# Patient Record
Sex: FEMALE | Race: WHITE | Employment: UNEMPLOYED | URBAN - METROPOLITAN AREA
[De-identification: names, ages, dates, MRNs, and addresses within clinical notes are randomized per-mention and may not be internally consistent; named-entity substitution may affect disease eponyms.]

---

## 2017-11-13 ENCOUNTER — HOSPITAL ENCOUNTER (OUTPATIENT)
Dept: MAMMOGRAPHY | Age: 52
Discharge: HOME | End: 2017-11-13
Attending: OBSTETRICS & GYNECOLOGY | Admitting: OBSTETRICS & GYNECOLOGY

## 2017-11-13 DIAGNOSIS — Z12.31 VISIT FOR SCREENING MAMMOGRAM: ICD-10-CM

## 2018-03-12 ENCOUNTER — OFFICE VISIT (OUTPATIENT)
Dept: ORTHOPEDIC SURGERY | Age: 53
End: 2018-03-12

## 2018-03-12 VITALS
HEIGHT: 66 IN | DIASTOLIC BLOOD PRESSURE: 87 MMHG | BODY MASS INDEX: 36.96 KG/M2 | HEART RATE: 73 BPM | WEIGHT: 230 LBS | SYSTOLIC BLOOD PRESSURE: 156 MMHG

## 2018-03-12 DIAGNOSIS — M25.562 LEFT KNEE PAIN, UNSPECIFIED CHRONICITY: Primary | ICD-10-CM

## 2018-03-12 DIAGNOSIS — M17.12 OSTEOARTHRITIS OF LEFT KNEE, UNSPECIFIED OSTEOARTHRITIS TYPE: ICD-10-CM

## 2018-03-12 PROCEDURE — G8484 FLU IMMUNIZE NO ADMIN: HCPCS | Performed by: ORTHOPAEDIC SURGERY

## 2018-03-12 PROCEDURE — 1036F TOBACCO NON-USER: CPT | Performed by: ORTHOPAEDIC SURGERY

## 2018-03-12 PROCEDURE — G8427 DOCREV CUR MEDS BY ELIG CLIN: HCPCS | Performed by: ORTHOPAEDIC SURGERY

## 2018-03-12 PROCEDURE — 3014F SCREEN MAMMO DOC REV: CPT | Performed by: ORTHOPAEDIC SURGERY

## 2018-03-12 PROCEDURE — 20610 DRAIN/INJ JOINT/BURSA W/O US: CPT | Performed by: ORTHOPAEDIC SURGERY

## 2018-03-12 PROCEDURE — 3017F COLORECTAL CA SCREEN DOC REV: CPT | Performed by: ORTHOPAEDIC SURGERY

## 2018-03-12 PROCEDURE — 99203 OFFICE O/P NEW LOW 30 MIN: CPT | Performed by: ORTHOPAEDIC SURGERY

## 2018-03-12 PROCEDURE — G8417 CALC BMI ABV UP PARAM F/U: HCPCS | Performed by: ORTHOPAEDIC SURGERY

## 2018-03-12 RX ORDER — ACETAMINOPHEN 500 MG
500 TABLET ORAL
COMMUNITY

## 2018-03-12 NOTE — PROGRESS NOTES
3/12/18 3:40 PM         NDC: 9812-9388-73    Lot Number: [id number]    Comments: left knee        3/12/18 3:40 PM         NDC: 40300-034-58    Lot Number: [id number]    Comments: left knee

## 2018-03-12 NOTE — PROGRESS NOTES
Date:  3/12/2018    Name:  Adriana Gonzalez  Address:  [address] [place] [address]    :  1965      Age:   46 y.o.    SSN:  [id number]       Medical Record Number:  [id number]    Reason for Visit:    Chief Complaint    New Patient (left knee pain)    History of Present Illness:  Adriana Gonzalez is a 46 y.o. female who presents today for an evaluation of her left knee. She states that she has had left knee pain for about 3 years that has been worsening since February. She denies any recent injury. She does admit to a history of a left knee open medial meniscectomy in . She did recover well from that surgery and was able to return to physical activities. Since February her pain in her knee has increased with going up and down stairs, prolonged sitting, prolonged standing and with sleeping at night. She denies locking/mechanical type symptoms. She did see her primary care physician at the end of last year who sent her to physical therapy. She states that she did do one visit, however was unsure what she was doing was correct. She does occasionally take ibuprofen as needed. Rest and ice to help to somewhat relieve her pain. She denies numbness and tingling down her lower extremities. Pain Assessment  Location of Pain: Knee  Location Modifiers: Left  Severity of Pain: 4  Quality of Pain: Aching, Throbbing  Frequency of Pain: Constant  Aggravating Factors: Other (Comment), Stairs, Walking, Standing, Straightening (at night it is worst)  Limiting Behavior: Some  Relieving Factors: Rest  Result of Injury: No  Work-Related Injury: No  Are there other pain locations you wish to document?: No    Review of Systems:  A 14 point review of systems available from today, 3/12/2018 and is in the scanned medical record as documented by the patient. The review is negative with the exception of those things mentioned in the History of Present Illness and Past Medical History.       Past History:  No past medical

## 2018-03-21 ENCOUNTER — HOSPITAL ENCOUNTER (OUTPATIENT)
Dept: PHYSICAL THERAPY | Age: 53
Discharge: HOME | End: 2018-03-31
Admitting: ORTHOPAEDIC SURGERY

## 2018-03-27 ENCOUNTER — HOSPITAL ENCOUNTER (OUTPATIENT)
Dept: PHYSICAL THERAPY | Age: 53
Discharge: HOME | End: 2018-03-28
Admitting: ORTHOPAEDIC SURGERY

## 2018-03-27 NOTE — FLOWSHEET NOTE
Mild tightness    ITB -Yuriy -Yuriy    Quad +Ely +Ely            ROM PROM AROM Comment    L R L R    Flexion   120 125    Extension   -2  0 with QS 0        Strength L R Comment   Quad 4/5 4+/5    Hamstring 4-/5 4-/5    Gastroc 5/5 5/5    Hip  flexion 4-/5 4/5    Hip abd 3+/5 4-/5        Special Test Results/Comment   Meniscal Click Neg   Crepitus + at 60 deg knee flex   Valgus Laxity Neg   Varus Laxity Neg   Lachmans Neg       Girth L R   Mid Patella 42.0 cm 43.3 cm   Suprapatellar 46.5 cm 47.1 cm   5cm above 49.0 cm 50.2 cm     Reflexes/Sensation:    [x]Dermatomes/Myotomes intact    []Reflexes equal and normal bilaterally   [x]Other: Numbness along L3 and L4 dermatome on L    Joint mobility: hypomobile patella in all directions, especially superior (slight pain with lateral glide)   []Normal    [x]Hypo   []Hyper    Palpation: tenderness along medial joint line and tibial tuberostiy    Functional Mobility/Transfers: independent    Gait: WNL    Balance:  SLS R EO: 30\", L EO: 25\"  SLS EC: 5\" bilaterally       RESTRICTIONS/PRECAUTIONS: none    Exercises/Interventions:     Exercise/Equipment Resistance/Repetitions Other comments   Stretching     Hamstring 3x30\" B Supine with strap   Hip Flexion 3x30\" B Prone, split stance   ITB     Grion     Quad     Inclined Calf     Towel Pull          SLR     Supine 3x10    Prone     Abduction 3x10    Adducton     SLR+          Isometrics     Quad sets          Patellar Glides     Medial     Superior     Inferior          ROM     Passive     Active     Weight Shift     Hang Weights     Sheet Pulls     Ankle Pumps          CKC     Calf raises     Wall sits     Step ups     1 leg stand     Squatting     CC TKE 3x10 Black band   Balance 3x30\" B airex   Bridges 3x10 Red band above knees   Clamshells 3x10 B Red band above knees             PRE     Extension  RANGE:   Flexion  RANGE:        Cable Column          Leg Press  RANGE:        Bike     Treadmill            Plan for next session:

## 2018-04-01 ENCOUNTER — HOSPITAL ENCOUNTER (OUTPATIENT)
Dept: PHYSICAL THERAPY | Age: 53
Discharge: HOME | End: 2018-04-30
Attending: ORTHOPAEDIC SURGERY | Admitting: ORTHOPAEDIC SURGERY

## 2018-04-02 ENCOUNTER — HOSPITAL ENCOUNTER (OUTPATIENT)
Dept: PHYSICAL THERAPY | Age: 53
Discharge: HOME | End: 2018-04-03
Admitting: ORTHOPAEDIC SURGERY

## 2018-04-05 ENCOUNTER — HOSPITAL ENCOUNTER (OUTPATIENT)
Dept: PHYSICAL THERAPY | Age: 53
Discharge: HOME | End: 2018-04-06
Admitting: ORTHOPAEDIC SURGERY

## 2018-04-09 ENCOUNTER — HOSPITAL ENCOUNTER (OUTPATIENT)
Dept: PHYSICAL THERAPY | Age: 53
Discharge: HOME | End: 2018-04-10
Admitting: ORTHOPAEDIC SURGERY

## 2018-04-13 ENCOUNTER — HOSPITAL ENCOUNTER (OUTPATIENT)
Dept: PHYSICAL THERAPY | Age: 53
Discharge: HOME | End: 2018-04-14
Admitting: ORTHOPAEDIC SURGERY

## 2018-04-19 ENCOUNTER — HOSPITAL ENCOUNTER (OUTPATIENT)
Dept: PHYSICAL THERAPY | Age: 53
Discharge: HOME | End: 2018-04-20
Admitting: ORTHOPAEDIC SURGERY

## 2018-04-23 ENCOUNTER — HOSPITAL ENCOUNTER (OUTPATIENT)
Dept: PHYSICAL THERAPY | Age: 53
Discharge: HOME | End: 2018-04-24
Admitting: ORTHOPAEDIC SURGERY

## 2018-04-25 ENCOUNTER — HOSPITAL ENCOUNTER (OUTPATIENT)
Dept: PHYSICAL THERAPY | Age: 53
Discharge: HOME | End: 2018-04-26
Admitting: ORTHOPAEDIC SURGERY

## 2018-04-30 ENCOUNTER — HOSPITAL ENCOUNTER (OUTPATIENT)
Dept: PHYSICAL THERAPY | Age: 53
Discharge: HOME | End: 2018-05-01
Admitting: ORTHOPAEDIC SURGERY

## 2018-05-01 ENCOUNTER — HOSPITAL ENCOUNTER (OUTPATIENT)
Dept: PHYSICAL THERAPY | Age: 53
Discharge: HOME | End: 2018-05-31
Attending: ORTHOPAEDIC SURGERY | Admitting: ORTHOPAEDIC SURGERY

## 2018-05-11 ENCOUNTER — HOSPITAL ENCOUNTER (OUTPATIENT)
Dept: PHYSICAL THERAPY | Age: 53
Discharge: HOME | End: 2018-05-12
Admitting: ORTHOPAEDIC SURGERY

## 2018-05-15 ENCOUNTER — HOSPITAL ENCOUNTER (OUTPATIENT)
Dept: PHYSICAL THERAPY | Age: 53
Discharge: HOME | End: 2018-05-16
Admitting: ORTHOPAEDIC SURGERY

## 2018-06-01 ENCOUNTER — HOSPITAL ENCOUNTER (OUTPATIENT)
Dept: PHYSICAL THERAPY | Age: 53
Discharge: HOME | End: 2018-06-30
Attending: ORTHOPAEDIC SURGERY | Admitting: ORTHOPAEDIC SURGERY

## 2018-06-04 ENCOUNTER — OFFICE VISIT (OUTPATIENT)
Dept: ORTHOPEDIC SURGERY | Age: 53
End: 2018-06-04

## 2018-06-04 VITALS
SYSTOLIC BLOOD PRESSURE: 139 MMHG | BODY MASS INDEX: 36.96 KG/M2 | DIASTOLIC BLOOD PRESSURE: 89 MMHG | HEART RATE: 78 BPM | HEIGHT: 66 IN | WEIGHT: 230 LBS

## 2018-06-04 DIAGNOSIS — M54.2 CERVICAL PAIN (NECK): ICD-10-CM

## 2018-06-04 DIAGNOSIS — M43.6 STIFFNESS OF CERVICAL SPINE: ICD-10-CM

## 2018-06-04 DIAGNOSIS — M17.12 PRIMARY OSTEOARTHRITIS OF LEFT KNEE: Primary | ICD-10-CM

## 2018-06-04 PROCEDURE — 3017F COLORECTAL CA SCREEN DOC REV: CPT | Performed by: ORTHOPAEDIC SURGERY

## 2018-06-04 PROCEDURE — G8427 DOCREV CUR MEDS BY ELIG CLIN: HCPCS | Performed by: ORTHOPAEDIC SURGERY

## 2018-06-04 PROCEDURE — 1036F TOBACCO NON-USER: CPT | Performed by: ORTHOPAEDIC SURGERY

## 2018-06-04 PROCEDURE — 99214 OFFICE O/P EST MOD 30 MIN: CPT | Performed by: ORTHOPAEDIC SURGERY

## 2018-06-04 PROCEDURE — G8417 CALC BMI ABV UP PARAM F/U: HCPCS | Performed by: ORTHOPAEDIC SURGERY

## 2018-06-04 PROCEDURE — 20610 DRAIN/INJ JOINT/BURSA W/O US: CPT | Performed by: ORTHOPAEDIC SURGERY

## 2018-10-03 ENCOUNTER — HOSPITAL ENCOUNTER (OUTPATIENT)
Dept: PHYSICAL THERAPY | Age: 53
Setting detail: THERAPIES SERIES
Discharge: HOME | End: 2018-10-03
Payer: COMMERCIAL

## 2018-10-03 DIAGNOSIS — M17.12 OSTEOARTHRITIS OF LEFT KNEE, UNSPECIFIED OSTEOARTHRITIS TYPE: Primary | ICD-10-CM

## 2018-10-03 PROCEDURE — G8979 MOBILITY GOAL STATUS: HCPCS | Performed by: PHYSICAL THERAPIST

## 2018-10-03 PROCEDURE — 97161 PT EVAL LOW COMPLEX 20 MIN: CPT | Performed by: PHYSICAL THERAPIST

## 2018-10-03 PROCEDURE — G8978 MOBILITY CURRENT STATUS: HCPCS | Performed by: PHYSICAL THERAPIST

## 2018-10-03 PROCEDURE — 97110 THERAPEUTIC EXERCISES: CPT | Performed by: PHYSICAL THERAPIST

## 2018-10-03 NOTE — PLAN OF CARE
joint protection, activity modification, progression of HEP. HEP instruction: Can be found scanned in media file. (see scanned forms)    GOALS:  Patient stated goal: decrease pain    Therapist goals for Patient:   Short Term Goals: To be achieved in: 2 weeks  1. Independent in HEP and progression per patient tolerance, in order to prevent re-injury. 2. Patient will have a decrease in pain to facilitate improvement in movement, function, and ADLs as indicated by Functional Deficits. Long Term Goals: To be achieved in: 4 weeks  1. Disability index score of 20% or less for the LEFS to assist with reaching prior level of function. 2. Patient will demonstrate increased AROM to 0 left knee extension to allow for proper joint functioning as indicated by patients Functional Deficits. 3. Patient will demonstrate an increase in Strength to 5/5 knee ext/hip abd to allow for proper functional mobility as indicated by patients Functional Deficits. 4. Patient will return to walking up/down one flight of stairs without increased symptoms or restriction.         Electronically signed by:  Yas Abreu PT

## 2018-10-03 NOTE — FLOWSHEET NOTE
Patient tolerated treatment well [] Patient limited by fatique  [] Patient limited by pain  [] Patient limited by other medical complications  [] Other:     Prognosis: [x] Good [] Fair  [] Poor    Patient Requires Follow-up: [x] Yes  [] No    PLAN FOR NEXT SESSION:     PLAN: See eval  [] Continue per plan of care [] Alter current plan (see comments)  [x] Plan of care initiated [] Hold pending MD visit [] Discharge    *If patient does not return for further follow ups after this date. Please consider this as the patients discharge from physical therapy.        Electronically signed by: Radha Greenberg PT

## 2018-10-05 ENCOUNTER — HOSPITAL ENCOUNTER (OUTPATIENT)
Dept: PHYSICAL THERAPY | Age: 53
Setting detail: THERAPIES SERIES
Discharge: HOME | End: 2018-10-05
Payer: COMMERCIAL

## 2018-10-05 PROCEDURE — 97110 THERAPEUTIC EXERCISES: CPT | Performed by: SPECIALIST/TECHNOLOGIST

## 2018-10-05 PROCEDURE — G0283 ELEC STIM OTHER THAN WOUND: HCPCS | Performed by: SPECIALIST/TECHNOLOGIST

## 2018-10-05 NOTE — FLOWSHEET NOTE
[facility] [address] [place] [address] [facility]    [facility] Therapy Daily Treatment Note  Date:  10/5/2018    Patient Name:  Raffaele Nixon    :  1965  MRN: [id number]  Restrictions/Precautions:    Medical/Treatment Diagnosis Information:  · Diagnosis: Left knee OA  M17.12  · Treatment Diagnosis: PT practice pattern: 4D,  left knee pain  Insurance/Certification information:  PT Insurance Information: [facility]  50 visits/yr (11 previously used)  $0 copay  Physician Information:  Referring Practitioner: Dr Jefferson Tapia of care signed (Y/N):     Date of Patient follow up with Physician:     G-Code (if applicable):  CK    Date G-Code Applied:  10/3/18  PT G-Codes  Functional Assessment Tool Used: LEFS  Score: 56%  Functional Limitation: Mobility: Walking and moving around  Mobility: Walking and Moving Around Current Status (): At least 40 percent but less than 60 percent impaired, limited or restricted  Mobility: Walking and Moving Around Goal Status (): At least 1 percent but less than 20 percent impaired, limited or restricted    Progress Note: [x]  Yes  []  No  Next due by: Visit #10       Latex Allergy:  [x]NO      []YES  Preferred Language for Healthcare:   [x]English       []other:    Visit # Insurance Allowable   2 50 (11 previously used)     Auth Required   []  Yes    [x] No    Visits Approved  Date Ranged-       Pain level:  4/10     SUBJECTIVE:  Pt. Reports her knees feel achy. OBJECTIVE:   Observation:   Test measurements:      RESTRICTIONS/PRECAUTIONS: OA, HBP, L knee scope '81    Exercises/Interventions:       Script: 10/31/18 MD 10/12/18  Exercise Sets/Reps Notes Last Progression   Gastroc Stretch 3x30'' Slant     Heelslides       LLLD Wallslide      HS Stretch:  Tableside  3x30''     SAQ      LAQ      SLR Flex 3x10     SLR Abd 3x10      SLR Ext      SLR Add.       Clamshells - SL X 20 B New     Bridges 3x10     HR/TR      Ankle Theraband      BAPS      Bike implemented, too soon to assess goals progression  [] Other:     ASSESSMENT:  See eval    Treatment/Activity Tolerance:  [x] Patient tolerated treatment well [] Patient limited by fatique  [] Patient limited by pain  [] Patient limited by other medical complications  [] Other:     Prognosis: [x] Good [] Fair  [] Poor    Patient Requires Follow-up: [x] Yes  [] No    PLAN FOR NEXT SESSION:     PLAN: See eval  [] Continue per plan of care [] Alter current plan (see comments)  [x] Plan of care initiated [] Hold pending MD visit [] Discharge    *If patient does not return for further follow ups after this date. Please consider this as the patients discharge from physical therapy.        Electronically signed by: Carolyne Sahu, [address], Griselda Costain, [address]

## 2018-10-11 ENCOUNTER — HOSPITAL ENCOUNTER (OUTPATIENT)
Dept: PHYSICAL THERAPY | Age: 53
Setting detail: THERAPIES SERIES
Discharge: HOME | End: 2018-10-11
Payer: COMMERCIAL

## 2018-10-11 PROCEDURE — 97112 NEUROMUSCULAR REEDUCATION: CPT | Performed by: PHYSICAL THERAPIST

## 2018-10-11 PROCEDURE — 97110 THERAPEUTIC EXERCISES: CPT | Performed by: PHYSICAL THERAPIST

## 2018-10-11 NOTE — FLOWSHEET NOTE
listed. [] Progression has been slowed due to co-morbidities. [x] Plan just implemented, too soon to assess goals progression  [] Other:     ASSESSMENT:  See eval    Treatment/Activity Tolerance:  [x] Patient tolerated treatment well [] Patient limited by fatique  [] Patient limited by pain  [] Patient limited by other medical complications  [] Other:     Prognosis: [x] Good [] Fair  [] Poor    Patient Requires Follow-up: [x] Yes  [] No    PLAN FOR NEXT SESSION:     PLAN: See eval  [] Continue per plan of care [] Alter current plan (see comments)  [x] Plan of care initiated [] Hold pending MD visit [] Discharge    *If patient does not return for further follow ups after this date. Please consider this as the patients discharge from physical therapy.        Electronically signed by: Jenifer Claudio PT [id number]

## 2018-10-12 ENCOUNTER — HOSPITAL ENCOUNTER (OUTPATIENT)
Dept: PHYSICAL THERAPY | Age: 53
Setting detail: THERAPIES SERIES
Discharge: HOME | End: 2018-10-12
Payer: COMMERCIAL

## 2018-10-12 PROCEDURE — G0283 ELEC STIM OTHER THAN WOUND: HCPCS | Performed by: SPECIALIST/TECHNOLOGIST

## 2018-10-12 PROCEDURE — 97110 THERAPEUTIC EXERCISES: CPT | Performed by: SPECIALIST/TECHNOLOGIST

## 2018-10-12 PROCEDURE — 97112 NEUROMUSCULAR REEDUCATION: CPT | Performed by: SPECIALIST/TECHNOLOGIST

## 2018-10-12 NOTE — FLOWSHEET NOTE
[address] [facility]    [facility] Daily Treatment Note  Date:  10/12/2018    Patient Name:  Betty Dietrich    :  1965  MRN: [id number]  Restrictions/Precautions:    Medical/Treatment Diagnosis Information:  · Diagnosis: Left knee OA  M17.12  · Treatment Diagnosis: PT practice pattern: 4D,  left knee pain  Insurance/Certification information:  PT Insurance Information: [facility]  50 visits/yr (11 previously used)  $0 copay  Physician Information:  Referring Practitioner: Dr Nerissa Giang of care signed (Y/N):     Date of Patient follow up with Physician:     G-Code (if applicable):  CK    Date G-Code Applied:  10/3/18  PT G-Codes  Functional Assessment Tool Used: LEFS  Score: 56%  Functional Limitation: Mobility: Walking and moving around  Mobility: Walking and Moving Around Current Status (): At least 40 percent but less than 60 percent impaired, limited or restricted  Mobility: Walking and Moving Around Goal Status (): At least 1 percent but less than 20 percent impaired, limited or restricted    Progress Note: [x]  Yes  []  No  Next due by: Visit #10       Latex Allergy:  [x]NO      []YES  Preferred Language for Healthcare:   [x]English       []other:    Visit # Insurance Allowable   4 48 (11 previously used)     Auth Required   []  Yes    [x] No    Visits Approved  Date Ranged-       Pain level:  4/10     SUBJECTIVE:  States the knee is doing fine today, still achy at times. OBJECTIVE:   Observation:   Test measurements:      RESTRICTIONS/PRECAUTIONS: OA, HBP, L knee scope '81    Exercises/Interventions:       Script: 10/31/18 MD 10/12/18  Exercise Sets/Reps Notes Last Progression   Gastroc Stretch 3x30'' Slant     Heelslides       LLLD Wallslide      HS Stretch:  Tableside  3x30''  manual   SAQ      LAQ      SLR Flex 3x10 staggered    SLR Abd 3x10      SLR Ext      SLR Add.       Clamshells - SL [place] loop X 30 B     Bridges - ball squeeze  3x10     HR/TR      Ankle Theraband      BAPS      Bike 5'     Elliptical      Standing Stretch:  (insert muscles)      Weight Shifting      Lateral Walk Orange loop 2x     Squats      Single Leg Stance Balance      Leg press 80# x 20 Hold today D/T pt was here yesterday    PEP 2'     [address] [place] 60# 20 x 3\" Hold today D/T pt was here yesterday. [employer] 6' x 20 each New             Therapeutic Exercise and NMR EXR  [x] (50687) Provided verbal/tactile cueing for activities related to strengthening, flexibility, endurance, ROM for improvements in LE, proximal hip, and core control with self care, mobility, lifting, ambulation. [x] (33582) Provided verbal/tactile cueing for activities related to improving balance, coordination, kinesthetic sense, posture, motor skill, proprioception  to assist with LE, proximal hip, and core control in self care, mobility, lifting, ambulation and eccentric single leg control.      NMR and Therapeutic Activities:    [] (62356 or 14295) Provided verbal/tactile cueing for activities related to improving balance, coordination, kinesthetic sense, posture, motor skill, proprioception and motor activation to allow for proper function of core, proximal hip and LE with self care and ADLs  [] (83483) Gait Re-education- Provided training and instruction to the patient for proper LE, core and proximal hip recruitment and positioning and eccentric body weight control with ambulation re-education including up and down stairs     Home Exercise Program:    [x] (01976) Reviewed/Progressed HEP activities related to strengthening, flexibility, endurance, ROM of core, proximal hip and LE for functional self-care, mobility, lifting and ambulation/stair navigation   [] (04599)Reviewed/Progressed HEP activities related to improving balance, coordination, kinesthetic sense, posture, motor skill, proprioception of core, proximal hip and LE for self care, mobility, lifting, and ambulation/stair progressing as expected towards functional goals listed. [] Progression is slowed due to complexities listed. [] Progression has been slowed due to co-morbidities. [x] Plan just implemented, too soon to assess goals progression  [] Other:     ASSESSMENT:  See eval    Treatment/Activity Tolerance:  [x] Patient tolerated treatment well [] Patient limited by fatique  [] Patient limited by pain  [] Patient limited by other medical complications  [] Other:     Prognosis: [x] Good [] Fair  [] Poor    Patient Requires Follow-up: [x] Yes  [] No    PLAN FOR NEXT SESSION:     PLAN: See eval  [] Continue per plan of care [] Alter current plan (see comments)  [x] Plan of care initiated [] Hold pending MD visit [] Discharge    *If patient does not return for further follow ups after this date. Please consider this as the patients discharge from physical therapy.        Electronically signed by: Esperanza Luna, [address], Karen Hong [address]

## 2018-10-15 ENCOUNTER — OFFICE VISIT (OUTPATIENT)
Dept: ORTHOPEDIC SURGERY | Age: 53
End: 2018-10-15
Payer: COMMERCIAL

## 2018-10-15 ENCOUNTER — HOSPITAL ENCOUNTER (OUTPATIENT)
Dept: PHYSICAL THERAPY | Age: 53
Setting detail: THERAPIES SERIES
Discharge: HOME | End: 2018-10-15
Payer: COMMERCIAL

## 2018-10-15 VITALS
SYSTOLIC BLOOD PRESSURE: 132 MMHG | WEIGHT: 200 LBS | DIASTOLIC BLOOD PRESSURE: 76 MMHG | BODY MASS INDEX: 32.14 KG/M2 | HEART RATE: 84 BPM | HEIGHT: 66 IN

## 2018-10-15 DIAGNOSIS — Z01.89 ENCOUNTER FOR LOWER EXTREMITY COMPARISON IMAGING STUDY: ICD-10-CM

## 2018-10-15 DIAGNOSIS — M22.2X1 RIGHT PATELLOFEMORAL SYNDROME: ICD-10-CM

## 2018-10-15 DIAGNOSIS — M25.561 RIGHT KNEE PAIN, UNSPECIFIED CHRONICITY: Primary | ICD-10-CM

## 2018-10-15 PROCEDURE — G8484 FLU IMMUNIZE NO ADMIN: HCPCS | Performed by: ORTHOPAEDIC SURGERY

## 2018-10-15 PROCEDURE — 99213 OFFICE O/P EST LOW 20 MIN: CPT | Performed by: ORTHOPAEDIC SURGERY

## 2018-10-15 PROCEDURE — G0283 ELEC STIM OTHER THAN WOUND: HCPCS | Performed by: SPECIALIST/TECHNOLOGIST

## 2018-10-15 PROCEDURE — 97112 NEUROMUSCULAR REEDUCATION: CPT | Performed by: SPECIALIST/TECHNOLOGIST

## 2018-10-15 PROCEDURE — G8417 CALC BMI ABV UP PARAM F/U: HCPCS | Performed by: ORTHOPAEDIC SURGERY

## 2018-10-15 PROCEDURE — 3017F COLORECTAL CA SCREEN DOC REV: CPT | Performed by: ORTHOPAEDIC SURGERY

## 2018-10-15 PROCEDURE — 1036F TOBACCO NON-USER: CPT | Performed by: ORTHOPAEDIC SURGERY

## 2018-10-15 PROCEDURE — 97110 THERAPEUTIC EXERCISES: CPT | Performed by: SPECIALIST/TECHNOLOGIST

## 2018-10-15 PROCEDURE — G8427 DOCREV CUR MEDS BY ELIG CLIN: HCPCS | Performed by: ORTHOPAEDIC SURGERY

## 2018-10-15 NOTE — FLOWSHEET NOTE
Progression is slowed due to complexities listed. [] Progression has been slowed due to co-morbidities. [x] Plan just implemented, too soon to assess goals progression  [] Other:     ASSESSMENT:  LLE strength and stability is improving. Treatment/Activity Tolerance:  [x] Patient tolerated treatment well [] Patient limited by fatique  [] Patient limited by pain  [] Patient limited by other medical complications  [] Other:     Prognosis: [x] Good [] Fair  [] Poor    Patient Requires Follow-up: [x] Yes  [] No    PLAN FOR NEXT SESSION:     PLAN: See eval  [] Continue per plan of care [] Alter current plan (see comments)  [x] Plan of care initiated [] Hold pending MD visit [] Discharge    *If patient does not return for further follow ups after this date. Please consider this as the patients discharge from physical therapy.        Electronically signed by: Esme Can, [address], Karen Melgar 1

## 2018-10-15 NOTE — PROGRESS NOTES
I have reviewed and agree to the content of the note created by the Physical Therapist Assistant.     Electronically signed by Gordon De La O PT

## 2018-10-17 NOTE — PROGRESS NOTES
it was checked for errors. It is possible that there are still dictated errors within this office note. If so, please bring any errors to my attention for an addendum. All efforts were made to ensure that this office note is accurate.

## 2018-10-18 ENCOUNTER — HOSPITAL ENCOUNTER (OUTPATIENT)
Dept: PHYSICAL THERAPY | Age: 53
Setting detail: THERAPIES SERIES
Discharge: HOME | End: 2018-10-18
Payer: COMMERCIAL

## 2018-10-18 PROCEDURE — G0283 ELEC STIM OTHER THAN WOUND: HCPCS | Performed by: SPECIALIST/TECHNOLOGIST

## 2018-10-18 PROCEDURE — 97110 THERAPEUTIC EXERCISES: CPT | Performed by: SPECIALIST/TECHNOLOGIST

## 2018-10-18 PROCEDURE — 97112 NEUROMUSCULAR REEDUCATION: CPT | Performed by: SPECIALIST/TECHNOLOGIST

## 2018-10-18 NOTE — FLOWSHEET NOTE
Scar Mobs / STM/Rollerstick / Knee (Flex./Ext.)  Stretch: H.S. / ITB / Piriformis / Quad / Groin / Hip Flexor   [] (09638) Provided manual therapy to mobilize LE, proximal hip and/or LS spine soft tissue/joints for the purpose of modulating pain, promoting relaxation,  increasing ROM, reducing/eliminating soft tissue swelling/inflammation/restriction, improving soft tissue extensibility and allowing for proper ROM for normal function with self care, mobility, lifting and ambulation. Modalities: PM + CP 15'     Charges:  Timed Code Treatment Minutes: 40   Total Treatment Minutes: 55     [] EVAL (LOW) 30631 (typically 20 minutes face-to-face)  [] EVAL (MOD) 15548 (typically 30 minutes face-to-face)  [] EVAL (HIGH) 92270 (typically 45 minutes face-to-face)  [] RE-EVAL     [x] GH(52547) x  2   [] IONTO  [x] NMR (17361) x  1   [] VASO  [] Manual (73561) x       [] Other:  [] TA x       [] Mech Traction (05271)  [] ES(attended) (43733)      [x] ES (un) (75177):     GOALS:   Short Term Goals: To be achieved in: 2 weeks  1. Independent in HEP and progression per patient tolerance, in order to prevent re-injury. 2. Patient will have a decrease in pain to facilitate improvement in movement, function, and ADLs as indicated by Functional Deficits. Long Term Goals: To be achieved in: 4 weeks  1. Disability index score of 20% or less for the LEFS to assist with reaching prior level of function. 2. Patient will demonstrate increased AROM to 0 left knee extension to allow for proper joint functioning as indicated by patients Functional Deficits. 3. Patient will demonstrate an increase in Strength to 5/5 knee ext/hip abd to allow for proper functional mobility as indicated by patients Functional Deficits. 4. Patient will return to walking up/down one flight of stairs without increased symptoms or restriction.      Progression Towards Functional goals:  [] Patient is progressing as expected towards functional goals

## 2018-10-29 ENCOUNTER — HOSPITAL ENCOUNTER (OUTPATIENT)
Dept: PHYSICAL THERAPY | Age: 53
Setting detail: THERAPIES SERIES
Discharge: HOME | End: 2018-10-29
Payer: COMMERCIAL

## 2018-10-29 PROCEDURE — 97110 THERAPEUTIC EXERCISES: CPT | Performed by: SPECIALIST/TECHNOLOGIST

## 2018-10-29 PROCEDURE — 97112 NEUROMUSCULAR REEDUCATION: CPT | Performed by: SPECIALIST/TECHNOLOGIST

## 2018-10-29 PROCEDURE — G0283 ELEC STIM OTHER THAN WOUND: HCPCS | Performed by: SPECIALIST/TECHNOLOGIST

## 2018-11-01 ENCOUNTER — HOSPITAL ENCOUNTER (OUTPATIENT)
Dept: PHYSICAL THERAPY | Age: 53
Setting detail: THERAPIES SERIES
Discharge: HOME | End: 2018-11-01
Payer: COMMERCIAL

## 2018-11-01 PROCEDURE — 97110 THERAPEUTIC EXERCISES: CPT | Performed by: PHYSICAL THERAPIST

## 2018-11-01 PROCEDURE — G0283 ELEC STIM OTHER THAN WOUND: HCPCS | Performed by: PHYSICAL THERAPIST

## 2018-11-01 PROCEDURE — 97112 NEUROMUSCULAR REEDUCATION: CPT | Performed by: PHYSICAL THERAPIST

## 2018-11-01 NOTE — FLOWSHEET NOTE
3x10     HR/TR      Ankle Theraband      BAPS      Bike 5' . Elliptical      Standing Stretch:  (insert muscles)      Weight Shifting      Lateral Walk Orange loop 2x     Squats      Single Leg Stance Balance      Leg press 100# x 30 Increase reps     PEP 2'     [address]  abd 60# 30 x 3\"  45# x 20 B   New     Step up and over 6' x 20  New             Therapeutic Exercise and NMR EXR  [x] (49884) Provided verbal/tactile cueing for activities related to strengthening, flexibility, endurance, ROM for improvements in LE, proximal hip, and core control with self care, mobility, lifting, ambulation. [x] (36836) Provided verbal/tactile cueing for activities related to improving balance, coordination, kinesthetic sense, posture, motor skill, proprioception  to assist with LE, proximal hip, and core control in self care, mobility, lifting, ambulation and eccentric single leg control.      NMR and Therapeutic Activities:    [] (06141 or 51040) Provided verbal/tactile cueing for activities related to improving balance, coordination, kinesthetic sense, posture, motor skill, proprioception and motor activation to allow for proper function of core, proximal hip and LE with self care and ADLs  [] (58458) Gait Re-education- Provided training and instruction to the patient for proper LE, core and proximal hip recruitment and positioning and eccentric body weight control with ambulation re-education including up and down stairs     Home Exercise Program:    [x] (19813) Reviewed/Progressed HEP activities related to strengthening, flexibility, endurance, ROM of core, proximal hip and LE for functional self-care, mobility, lifting and ambulation/stair navigation   [] (38288)Reviewed/Progressed HEP activities related to improving balance, coordination, kinesthetic sense, posture, motor skill, proprioception of core, proximal hip and LE for self care, mobility, lifting, and ambulation/stair navigation      Manual Treatments:  PROM /

## 2018-11-05 ENCOUNTER — HOSPITAL ENCOUNTER (OUTPATIENT)
Dept: PHYSICAL THERAPY | Age: 53
Setting detail: THERAPIES SERIES
Discharge: HOME | End: 2018-11-05
Payer: COMMERCIAL

## 2018-11-08 ENCOUNTER — HOSPITAL ENCOUNTER (OUTPATIENT)
Dept: PHYSICAL THERAPY | Age: 53
Setting detail: THERAPIES SERIES
Discharge: HOME | End: 2018-11-08
Payer: COMMERCIAL

## 2018-11-08 PROCEDURE — G8979 MOBILITY GOAL STATUS: HCPCS | Performed by: PHYSICAL THERAPIST

## 2018-11-08 PROCEDURE — G8978 MOBILITY CURRENT STATUS: HCPCS | Performed by: PHYSICAL THERAPIST

## 2018-11-08 PROCEDURE — 97110 THERAPEUTIC EXERCISES: CPT | Performed by: PHYSICAL THERAPIST

## 2018-11-08 PROCEDURE — G0283 ELEC STIM OTHER THAN WOUND: HCPCS | Performed by: PHYSICAL THERAPIST

## 2018-11-08 PROCEDURE — 97112 NEUROMUSCULAR REEDUCATION: CPT | Performed by: PHYSICAL THERAPIST

## 2018-11-08 NOTE — FLOWSHEET NOTE
Knee (Flex./Ext.)  Stretch: H.S. / ITB / Piriformis / Quad / Groin / Hip Flexor   [] (59120) Provided manual therapy to mobilize LE, proximal hip and/or LS spine soft tissue/joints for the purpose of modulating pain, promoting relaxation,  increasing ROM, reducing/eliminating soft tissue swelling/inflammation/restriction, improving soft tissue extensibility and allowing for proper ROM for normal function with self care, mobility, lifting and ambulation. Modalities: PM + CP 15'     Charges:  Timed Code Treatment Minutes: 40   Total Treatment Minutes: 55     [] EVAL (LOW) 65589 (typically 20 minutes face-to-face)  [] EVAL (MOD) 80363 (typically 30 minutes face-to-face)  [] EVAL (HIGH) 99741 (typically 45 minutes face-to-face)  [] RE-EVAL     [x] JH(13340) x  2   [] IONTO  [x] NMR (24347) x  1   [] VASO  [] Manual (43457) x       [] Other:  [] TA x       [] Mech Traction (52978)  [] ES(attended) (01274)      [x] ES (un) (81078):     GOALS:   Short Term Goals: To be achieved in: 2 weeks  1. Independent in HEP and progression per patient tolerance, in order to prevent re-injury. 2. Patient will have a decrease in pain to facilitate improvement in movement, function, and ADLs as indicated by Functional Deficits. Long Term Goals: To be achieved in: 4 weeks  1. Disability index score of 20% or less for the LEFS to assist with reaching prior level of function. 2. Patient will demonstrate increased AROM to 0 left knee extension to allow for proper joint functioning as indicated by patients Functional Deficits. 3. Patient will demonstrate an increase in Strength to 5/5 knee ext/hip abd to allow for proper functional mobility as indicated by patients Functional Deficits. 4. Patient will return to walking up/down one flight of stairs without increased symptoms or restriction. Progression Towards Functional goals:  [] Patient is progressing as expected towards functional goals listed.     [] Progression is

## 2018-11-08 NOTE — PLAN OF CARE
Strength     Hip abd  4/5   Knee ext  4+/5               Gait: Decreased heel strike     Joint mobility:    []Normal    [x]Hypo   []Hyper    Palpation: medial joint line    Orthopedic Tests: n/a    OTHER:      ASSESSMENT: Pain symptoms are intermittent. Some increase in quad and glut medius strength. Endurance is fair and fatigues easily when doing exercises. Response to Treatment:   [x]Patient is responding well to treatment and improvement is noted with regards  to goals   []Patient should continue to improve in reasonable time if they continue HEP   []Patient has plateaued and is no longer responding to skilled PT intervention    []Patient is getting worse and would benefit from return to referring MD   []Patient unable to adhere to initial POC    Functional deficiencies/Impairements which affect ADL's and Reduce overall function:     [x]decreased core/proximal hip strength and neuromuscular control - Reduced  overall function   [x]decreased LE ROM/joint mobility- Reduced overall Function    [x]decreased LE strength- Reduced overall function with gait and steps   []difficulty with SLS- Reduced overall function and possible falls risk   [x]pain/difficulty with ambulation- reduced overall function and mobility   []unable to perform sport/recreational activity due to pain and dysfunction   []other:       Prognosis/Rehab Potential:    []Excellent   [x]Good    []Fair   []Poor: Toleration of evaluation or treatment:    []Excellent   [x]Good    []Fair   []Poor       GOALS: Long Term Goals: To be achieved in: 4 weeks  1. Disability index score of 20% or less for the LEFS to assist with reaching prior level of function. 2. Patient will demonstrate increased AROM to 0 left knee extension to allow for proper joint functioning as indicated by patients Functional Deficits.    3. Patient will demonstrate an increase in Strength to 5/5 knee ext/hip abd to allow for proper functional mobility as indicated by

## 2018-11-12 ENCOUNTER — HOSPITAL ENCOUNTER (OUTPATIENT)
Dept: PHYSICAL THERAPY | Age: 53
Setting detail: THERAPIES SERIES
Discharge: HOME | End: 2018-11-12
Payer: COMMERCIAL

## 2018-11-12 PROCEDURE — G0283 ELEC STIM OTHER THAN WOUND: HCPCS | Performed by: PHYSICAL THERAPIST

## 2018-11-12 PROCEDURE — 97110 THERAPEUTIC EXERCISES: CPT | Performed by: PHYSICAL THERAPIST

## 2018-11-12 PROCEDURE — 97112 NEUROMUSCULAR REEDUCATION: CPT | Performed by: PHYSICAL THERAPIST

## 2018-11-12 NOTE — FLOWSHEET NOTE
and LE for self care, mobility, lifting, and ambulation/stair navigation      Manual Treatments:  PROM / Scar Mobs / STM/Rollerstick / Knee (Flex./Ext.)  Stretch: H.S. / ITB / Piriformis / [place] / Groin / Hip Flexor   [] (73106) Provided manual therapy to mobilize LE, proximal hip and/or LS spine soft tissue/joints for the purpose of modulating pain, promoting relaxation,  increasing ROM, reducing/eliminating soft tissue swelling/inflammation/restriction, improving soft tissue extensibility and allowing for proper ROM for normal function with self care, mobility, lifting and ambulation. Modalities: PM + CP 15'     Charges:  Timed Code Treatment Minutes: 40   Total Treatment Minutes: 55     [] EVAL (LOW) 91297 (typically 20 minutes face-to-face)  [] EVAL (MOD) 25710 (typically 30 minutes face-to-face)  [] EVAL (HIGH) 30848 (typically 45 minutes face-to-face)  [] RE-EVAL     [x] YD(73946) x  2   [] IONTO  [x] NMR (89319) x  1   [] VASO  [] Manual (60764) x       [] Other:  [] TA x       [] Mech Traction (94038)  [] ES(attended) (02975)      [x] ES (un) (40222):     GOALS:   Short Term Goals: To be achieved in: 2 weeks  1. Independent in HEP and progression per patient tolerance, in order to prevent re-injury. 2. Patient will have a decrease in pain to facilitate improvement in movement, function, and ADLs as indicated by Functional Deficits. Long Term Goals: To be achieved in: 4 weeks  1. Disability index score of 20% or less for the LEFS to assist with reaching prior level of function. 2. Patient will demonstrate increased AROM to 0 left knee extension to allow for proper joint functioning as indicated by patients Functional Deficits. 3. Patient will demonstrate an increase in Strength to 5/5 knee ext/hip abd to allow for proper functional mobility as indicated by patients Functional Deficits. 4. Patient will return to walking up/down one flight of stairs without increased symptoms or restriction.

## 2018-11-15 ENCOUNTER — HOSPITAL ENCOUNTER (OUTPATIENT)
Dept: PHYSICAL THERAPY | Age: 53
Setting detail: THERAPIES SERIES
Discharge: HOME | End: 2018-11-15
Payer: COMMERCIAL

## 2018-11-15 PROCEDURE — G0283 ELEC STIM OTHER THAN WOUND: HCPCS | Performed by: SPECIALIST/TECHNOLOGIST

## 2018-11-15 PROCEDURE — 97112 NEUROMUSCULAR REEDUCATION: CPT | Performed by: SPECIALIST/TECHNOLOGIST

## 2018-11-15 PROCEDURE — 97110 THERAPEUTIC EXERCISES: CPT | Performed by: SPECIALIST/TECHNOLOGIST

## 2018-11-15 NOTE — PROGRESS NOTES
I have reviewed and agree to the content of the note created by the Physical Therapist Assistant.     Electronically signed by Malissa Lovelace PT

## 2018-11-19 ENCOUNTER — HOSPITAL ENCOUNTER (OUTPATIENT)
Dept: PHYSICAL THERAPY | Age: 53
Setting detail: THERAPIES SERIES
Discharge: HOME | End: 2018-11-19
Payer: COMMERCIAL

## 2018-11-21 ENCOUNTER — HOSPITAL ENCOUNTER (OUTPATIENT)
Dept: PHYSICAL THERAPY | Age: 53
Setting detail: THERAPIES SERIES
Discharge: HOME | End: 2018-11-21
Payer: COMMERCIAL

## 2018-11-21 PROCEDURE — 97110 THERAPEUTIC EXERCISES: CPT | Performed by: SPECIALIST/TECHNOLOGIST

## 2018-11-21 PROCEDURE — G0283 ELEC STIM OTHER THAN WOUND: HCPCS | Performed by: SPECIALIST/TECHNOLOGIST

## 2018-11-21 PROCEDURE — 97112 NEUROMUSCULAR REEDUCATION: CPT | Performed by: SPECIALIST/TECHNOLOGIST

## 2018-11-26 ENCOUNTER — HOSPITAL ENCOUNTER (OUTPATIENT)
Dept: PHYSICAL THERAPY | Age: 53
Setting detail: THERAPIES SERIES
Discharge: HOME | End: 2018-11-26
Payer: COMMERCIAL

## 2018-11-26 ENCOUNTER — HOSPITAL ENCOUNTER (OUTPATIENT)
Dept: MAMMOGRAPHY | Age: 53
Discharge: HOME | End: 2018-11-26
Payer: COMMERCIAL

## 2018-11-26 DIAGNOSIS — Z12.31 VISIT FOR SCREENING MAMMOGRAM: ICD-10-CM

## 2018-11-26 PROCEDURE — 97110 THERAPEUTIC EXERCISES: CPT | Performed by: PHYSICAL THERAPIST

## 2018-11-26 PROCEDURE — 97112 NEUROMUSCULAR REEDUCATION: CPT | Performed by: PHYSICAL THERAPIST

## 2018-11-26 PROCEDURE — G0283 ELEC STIM OTHER THAN WOUND: HCPCS | Performed by: PHYSICAL THERAPIST

## 2018-11-26 PROCEDURE — 77067 SCR MAMMO BI INCL CAD: CPT

## 2018-11-26 NOTE — FLOWSHEET NOTE
[facility] [address] [place] [address] [facility] [place]    Physical Therapy Daily Treatment Note  Date:  2018    Patient Name:  Con Regalado    :  1965  MRN: [id number]  Restrictions/Precautions:    Medical/Treatment Diagnosis Information:  · Diagnosis: Left knee OA  M17.12  · Treatment Diagnosis: PT practice pattern: 4D,  left knee pain  Insurance/Certification information:  PT Insurance Information: [facility]  50 visits/yr (11 previously used)  $0 copay  Physician Information:  Referring Practitioner: Dr Eliot Carter of care signed (Y/N):     Date of Patient follow up with Physician:     G-Code (if applicable):  CK    Date G-Code Applied:  10/3/18  PT G-Codes  Functional Assessment Tool Used: LEFS  Score: 56%  Functional Limitation: Mobility: Walking and moving around  Mobility: Walking and Moving Around Current Status (): At least 40 percent but less than 60 percent impaired, limited or restricted  Mobility: Walking and Moving Around Goal Status (): At least 1 percent but less than 20 percent impaired, limited or restricted    Progress Note: [x]  Yes  []  No  Next due by: Visit #10       Latex Allergy:  [x]NO      []YES  Preferred Language for Healthcare:   [x]English       []other:    Visit # Insurance Allowable   13 (next note needed on visit 23) 48 (11 previously used)     Auth Required   []  Yes    [x] No    Visits Approved  Date Ranged-       Pain level:  4-5/10     SUBJECTIVE:  Patient states her hip was hurting yesterday and she went to to hang shannon light which did help with the pain. Was able to go up and down about 15 flights of stairs yesterday.      OBJECTIVE:    Observation:   Test measurements:  See re-cert note    RESTRICTIONS/PRECAUTIONS: OA, HBP, L knee scope '81    Exercises/Interventions:       Script: 18   MD 10/12/18  Exercise Sets/Reps Notes Last Progression   Gastroc Stretch 3x30'' Slant     Heelslides       LLLD Wallslide      HS Stretch: flight of stairs without increased symptoms or restriction. Progression Towards Functional goals:  [] Patient is progressing as expected towards functional goals listed. [] Progression is slowed due to complexities listed. [] Progression has been slowed due to co-morbidities. [x] Plan just implemented, too soon to assess goals progression  [] Other:     ASSESSMENT:  Pt is demonstrating increase stability and strength in LLE. Treatment/Activity Tolerance:  [x] Patient tolerated treatment well [] Patient limited by fatique  [] Patient limited by pain  [] Patient limited by other medical complications  [] Other:     Prognosis: [x] Good [] Fair  [] Poor    Patient Requires Follow-up: [x] Yes  [] No    PLAN FOR NEXT SESSION:     PLAN:   [x] Continue per plan of care [] Alter current plan (see comments)  [x] Plan of care initiated [] Hold pending MD visit [] Discharge    *If patient does not return for further follow ups after this date. Please consider this as the patients discharge from physical therapy.        Electronically signed by:  Karen Adair 1

## 2018-11-29 ENCOUNTER — HOSPITAL ENCOUNTER (OUTPATIENT)
Dept: PHYSICAL THERAPY | Age: 53
Setting detail: THERAPIES SERIES
Discharge: HOME | End: 2018-11-29
Payer: COMMERCIAL

## 2018-11-29 PROCEDURE — 97112 NEUROMUSCULAR REEDUCATION: CPT | Performed by: SPECIALIST/TECHNOLOGIST

## 2018-11-29 PROCEDURE — 97110 THERAPEUTIC EXERCISES: CPT | Performed by: SPECIALIST/TECHNOLOGIST

## 2018-11-29 PROCEDURE — G0283 ELEC STIM OTHER THAN WOUND: HCPCS | Performed by: SPECIALIST/TECHNOLOGIST

## 2018-12-03 ENCOUNTER — HOSPITAL ENCOUNTER (OUTPATIENT)
Dept: PHYSICAL THERAPY | Age: 53
Setting detail: THERAPIES SERIES
Discharge: HOME | End: 2018-12-03
Payer: COMMERCIAL

## 2018-12-03 PROCEDURE — 97112 NEUROMUSCULAR REEDUCATION: CPT | Performed by: SPECIALIST/TECHNOLOGIST

## 2018-12-03 PROCEDURE — G0283 ELEC STIM OTHER THAN WOUND: HCPCS | Performed by: SPECIALIST/TECHNOLOGIST

## 2018-12-03 PROCEDURE — 97110 THERAPEUTIC EXERCISES: CPT | Performed by: SPECIALIST/TECHNOLOGIST

## 2018-12-03 NOTE — FLOWSHEET NOTE
[facility] [address] [place] [address] [facility]    Physical Therapy Daily Treatment Note  Date:  12/3/2018    Patient Name:  Efren Ferrara    :  1965  MRN: [id number]  Restrictions/Precautions:    Medical/Treatment Diagnosis Information:  · Diagnosis: Left knee OA  M17.12  · Treatment Diagnosis: PT practice pattern: 4D,  left knee pain  Insurance/Certification information:  PT Insurance Information: [facility]  50 visits/yr (11 previously used)  $0 copay  Physician Information:  Referring Practitioner: Dr Tona Zuniga of care signed (Y/N):     Date of Patient follow up with Physician:     G-Code (if applicable):  CK    Date G-Code Applied:  10/3/18  PT G-Codes  Functional Assessment Tool Used: LEFS  Score: 56%  Functional Limitation: Mobility: Walking and moving around  Mobility: Walking and Moving Around Current Status (): At least 40 percent but less than 60 percent impaired, limited or restricted  Mobility: Walking and Moving Around Goal Status (): At least 1 percent but less than 20 percent impaired, limited or restricted    Progress Note: [x]  Yes  []  No  Next due by: Visit #10       Latex Allergy:  [x]NO      []YES  Preferred Language for Healthcare:   [x]English       []other:    Visit # Insurance Allowable   15 (next note needed on visit 23) 48 (11 previously used)     Auth Required   []  Yes    [x] No    Visits Approved  Date Ranged-       Pain level:  4-5/10     SUBJECTIVE:  Pt reports that she is sore in her R hip and is having shooting pain down her RLE. States knee pain is triggered by activities and hip pain is constant.            OBJECTIVE:     Observation:   Test measurements:  See re-cert note    RESTRICTIONS/PRECAUTIONS: OA, HBP, L knee scope '    Exercises/Interventions:       Script: 18   MD 10/12/18  Exercise Sets/Reps Notes Last Progression   Gastroc Stretch 3x30'' Slant     Heelslides       LLLD Wallslide      HS Stretch:  Tableside  3x30''  manual proprioception of core, proximal hip and LE for self care, mobility, lifting, and ambulation/stair navigation      Manual Treatments:  PROM / Scar Mobs / STM/Rollerstick / Knee (Flex./Ext.)  Stretch: H.S. / ITB / Piriformis / Katherleen Royal / Groin / Hip Flexor   [] (66998) Provided manual therapy to mobilize LE, proximal hip and/or LS spine soft tissue/joints for the purpose of modulating pain, promoting relaxation,  increasing ROM, reducing/eliminating soft tissue swelling/inflammation/restriction, improving soft tissue extensibility and allowing for proper ROM for normal function with self care, mobility, lifting and ambulation. Modalities: PM + CP 15'     Charges:  Timed Code Treatment Minutes: 40   Total Treatment Minutes: 55     [] EVAL (LOW) 30621 (typically 20 minutes face-to-face)  [] EVAL (MOD) 02218 (typically 30 minutes face-to-face)  [] EVAL (HIGH) 64792 (typically 45 minutes face-to-face)  [] RE-EVAL      [x] AI(86013) x  2   [] IONTO  [x] NMR (72425) x  1   [] VASO  [] Manual (71618) x       [] Other:  [] TA x       [] Mech Traction (26367)  [] ES(attended) (52691)      [x] ES (un) (25925):     GOALS:   Short Term Goals: To be achieved in: 2 weeks  1. Independent in HEP and progression per patient tolerance, in order to prevent re-injury. 2. Patient will have a decrease in pain to facilitate improvement in movement, function, and ADLs as indicated by Functional Deficits. Long Term Goals: To be achieved in: 4 weeks  1. Disability index score of 20% or less for the LEFS to assist with reaching prior level of function. 2. Patient will demonstrate increased AROM to 0 left knee extension to allow for proper joint functioning as indicated by patients Functional Deficits. 3. Patient will demonstrate an increase in Strength to 5/5 knee ext/hip abd to allow for proper functional mobility as indicated by patients Functional Deficits.    4. Patient will return to walking up/down one flight of stairs without increased symptoms or restriction. Progression Towards Functional goals:  [] Patient is progressing as expected towards functional goals listed. [] Progression is slowed due to complexities listed. [] Progression has been slowed due to co-morbidities. [x] Plan just implemented, too soon to assess goals progression  [] Other:     ASSESSMENT:  No increase of hip pain with exercises. Treatment/Activity Tolerance:  [x] Patient tolerated treatment well [] Patient limited by fatique  [] Patient limited by pain  [] Patient limited by other medical complications  [] Other:     Prognosis: [x] Good [] Fair  [] Poor    Patient Requires Follow-up: [x] Yes  [] No    PLAN FOR NEXT SESSION:     PLAN:   [x] Continue per plan of care [] Alter current plan (see comments)  [x] Plan of care initiated [] Hold pending MD visit [] Discharge    *If patient does not return for further follow ups after this date. Please consider this as the patients discharge from physical therapy. Electronically signed by: La Silver.  Christophe Draper [place], [address], Karen Segal 1

## 2018-12-06 ENCOUNTER — HOSPITAL ENCOUNTER (OUTPATIENT)
Dept: PHYSICAL THERAPY | Age: 53
Setting detail: THERAPIES SERIES
Discharge: HOME | End: 2018-12-06
Payer: COMMERCIAL

## 2018-12-06 PROCEDURE — G0283 ELEC STIM OTHER THAN WOUND: HCPCS | Performed by: SPECIALIST/TECHNOLOGIST

## 2018-12-06 PROCEDURE — 97110 THERAPEUTIC EXERCISES: CPT | Performed by: SPECIALIST/TECHNOLOGIST

## 2018-12-06 PROCEDURE — G8978 MOBILITY CURRENT STATUS: HCPCS | Performed by: SPECIALIST/TECHNOLOGIST

## 2018-12-06 PROCEDURE — 97112 NEUROMUSCULAR REEDUCATION: CPT | Performed by: SPECIALIST/TECHNOLOGIST

## 2018-12-06 PROCEDURE — G8979 MOBILITY GOAL STATUS: HCPCS | Performed by: SPECIALIST/TECHNOLOGIST

## 2018-12-10 ENCOUNTER — HOSPITAL ENCOUNTER (OUTPATIENT)
Dept: PHYSICAL THERAPY | Age: 53
Setting detail: THERAPIES SERIES
Discharge: HOME | End: 2018-12-10
Payer: COMMERCIAL

## 2018-12-10 ENCOUNTER — OFFICE VISIT (OUTPATIENT)
Dept: ORTHOPEDIC SURGERY | Age: 53
End: 2018-12-10
Payer: COMMERCIAL

## 2018-12-10 VITALS
BODY MASS INDEX: 32.14 KG/M2 | SYSTOLIC BLOOD PRESSURE: 149 MMHG | HEIGHT: 66 IN | HEART RATE: 88 BPM | WEIGHT: 200 LBS | DIASTOLIC BLOOD PRESSURE: 82 MMHG

## 2018-12-10 DIAGNOSIS — M25.551 RIGHT HIP PAIN: Primary | ICD-10-CM

## 2018-12-10 DIAGNOSIS — M70.61 GREATER TROCHANTERIC BURSITIS OF RIGHT HIP: ICD-10-CM

## 2018-12-10 PROCEDURE — 99214 OFFICE O/P EST MOD 30 MIN: CPT | Performed by: ORTHOPAEDIC SURGERY

## 2018-12-10 PROCEDURE — 97110 THERAPEUTIC EXERCISES: CPT | Performed by: PHYSICAL THERAPIST

## 2018-12-10 PROCEDURE — 1036F TOBACCO NON-USER: CPT | Performed by: ORTHOPAEDIC SURGERY

## 2018-12-10 PROCEDURE — 97112 NEUROMUSCULAR REEDUCATION: CPT | Performed by: PHYSICAL THERAPIST

## 2018-12-10 PROCEDURE — 3017F COLORECTAL CA SCREEN DOC REV: CPT | Performed by: ORTHOPAEDIC SURGERY

## 2018-12-10 PROCEDURE — G8417 CALC BMI ABV UP PARAM F/U: HCPCS | Performed by: ORTHOPAEDIC SURGERY

## 2018-12-10 PROCEDURE — G8484 FLU IMMUNIZE NO ADMIN: HCPCS | Performed by: ORTHOPAEDIC SURGERY

## 2018-12-10 PROCEDURE — G8427 DOCREV CUR MEDS BY ELIG CLIN: HCPCS | Performed by: ORTHOPAEDIC SURGERY

## 2018-12-10 NOTE — FLOWSHEET NOTE
[address] [facility]    Physical Therapy Daily Treatment Note  Date:  12/10/2018    Patient Name:  Noy Wakefield    :  1965  MRN: [id number]  Restrictions/Precautions:    Medical/Treatment Diagnosis Information:  · Diagnosis: Left knee OA  M17.12  · Treatment Diagnosis: PT practice pattern: 4D,  left knee pain  Insurance/Certification information:  PT Insurance Information: [facility]  50 visits/yr (11 previously used)  $0 copay  Physician Information:  Referring Practitioner: Dr Guzman Case of care signed (Y/N):     Date of Patient follow up with Physician:     G-Code (if applicable):  CK    Date G-Code Applied:  10/3/18  PT G-Codes  Functional Assessment Tool Used: LEFS  Score: 50%  Functional Limitation: Mobility: Walking and moving around  Mobility: Walking and Moving Around Current Status (): At least 40 percent but less than 60 percent impaired, limited or restricted  Mobility: Walking and Moving Around Goal Status (): At least 1 percent but less than 20 percent impaired, limited or restricted    Progress Note: [x]  Yes  []  No  Next due by: Visit #10       Latex Allergy:  [x]NO      []YES  Preferred Language for Healthcare:   [x]English       []other:    Visit # Insurance Allowable   17(next note needed on visit 32) 48 (11 previously used)     Auth Required   []  Yes    [x] No    Visits Approved  Date Ranged-       Pain level:  3/10     SUBJECTIVE:          OBJECTIVE:     Observation:   Test measurements:  See re-cert note     RESTRICTIONS/PRECAUTIONS: OA, HBP, L knee scope '81    Exercises/Interventions:       Script: 18 - check POC note on 18   MD 10/12/18  Exercise Sets/Reps Notes Last Progression   Gastroc Stretch 3x30'' Slant     Heelslides       LLLD Wallslide      HS Stretch:  Tableside  3x30''  manual   SAQ 3# 3x10     LAQ      SLR Flex 3x10 staggered    SLR Abd 2# 3x10      SLR Ext      SLR Add.       Clamshells - SL [place]

## 2018-12-10 NOTE — PROGRESS NOTES
file     Social History Narrative    No narrative on file     No family history on file. Review of Systems:    Estephania Briscoe reported review of systemsDated 10/15/18 has been reviewed and has been scanned into her medical record for today's visit. The scanned image can be found in media images folder. She was instructed to contact her primary care physician regarding ROS positives if not already being addressed during today's visit. Objective:   Physical Exam  Vital Signs:  BP (!) 149/82   Pulse 88   Ht 5' 6\" (1.676 m)   Wt 200 lb (90.7 kg)   BMI 32.28 kg/m²     Constitution:  Generally, Kyle Burrell is [x] alert, [x] appears stated age, and [x] in no distress.   Her general body habitus is [] Cachectic  [] Thin  [x] Normal  [x] Obese  [] Morbidly Obese    Head: [x] Normocephalic  Eyes: [x] Extra-occular muscles intact  Left Ear: [x] External Ear normal   Right Ear: [x] External Ear normal   Nose: [x] Normal  Mouth: [x] Oral mucosa moist  [x] No perioral lesions    Pulmonary: [x] Respirations unlabored and regular  Skin: [x] Warm [x] Well perfused     Psychiatric:   [x] Good judgement and insight  [x] Oriented to [x] person, [x] place, and [x] time  [x] Mood appropriate for circumstances    Gait:   Gait is [x] Normal  [] Impaired   Assistive Device: [] None  [] Knee Brace  [] Cane  [] Crutches   [] Toth Marisel   [] Wheelchair  [] Other     ORTHOPAEDIC LS SPINE EXAM   Inspection:  [x] Skin intact without abrasion, lacerations, surgical scars, pigment changes, dimpling, hairy patches or rashes  [x] Normal back alignment  [] Scoliosis [] Kyphosis  [] Dimpling  [] Hyper/hypopigmentation  [] Hairy Patches  [] Scar / Surgical incision    Palpation:   Nontender    Provocative Tests:  Negative Straight leg raise test    RIGHT HIP ORTHOPAEDIC  EXAM:   Inspection:  [x] Skin intact without abrasion, lacerations or rashes  [x] Leg lengths equal  [x] Ecchymosis:  [x] none  [] mild  [] moderate  [] severe   [x] trochanter    Provocative Tests:  [x] Negative  Positive Tests:  [] Log Roll   [] Yuriy Test: ITB Tightness   [] FADIR Anterior impingement: Negative   [] Posterior Impingement    [] Shuck test for insufficient suction seal   [] Dial test for capsular insufficiency:    [] Resisted adduction for athletic pubalgia   [] Resisted curl up for athletic pubalgia     Motor Function:  [x] No gross motor weakness of hip [x] No gross motor weakness of knee  [x] No gross motor weakness of ankle    [x] No gross motor weakness of great toe    [x] Motor strength:   [x] Hip Flex [x] 5/5 [] 4/5 [] 3/5 [] 2/5 [] 1/5 [] 0/5   [x] Hip ABductors [x] 5-/5 [] 4/5 [] 3/5 [] 2/5 [] 1/5 [] 0/5   [x] Hip ADductors [x] 5/5 [] 4/5 [] 3/5 [] 2/5 [] 1/5 [] 0/5     Neurologic:  [x] Sensation to light touch intact  [x] Coordination / proprioception intact    Circulation:  [x] The limb is warm and well perfused. [x] Capillary refill is intact. [x] Edema:  [x] none  [] mild  [] moderate  [] severe     Data Reviewed:     XRays:  (4 views: Standing AP, 45 degree [place], Frog leg lateral and False Profile) of her right hip and pelvis taken today 12/10/18 in the office and reviewed by me personally showed: Well preserved joint space. There is pincer dominant GONZALES . No radiolucent lines to suggest fracture or any osteoblastic lesions. Assessment:     Kyle Burrell is a 48y.o. year old female who presents with right sided Greater trochanteric pain syndrome. This is a condition which is comprised of trochanteric bursitis, IT band focal tightness, and gluteus medius tendinopathy. It is a chronic condition very commonly seen in this age group. Generally, speaking extra-articular etiologies of hip pain respond extremely well to nonoperative management involving PT, injections and/or NSAIDs, although the rate of clinical improvement is slow and requires patience and consistency with therapy. Diagnosis:    Diagnosis Orders   1.  Right hip pain  XR Hip

## 2018-12-13 ENCOUNTER — APPOINTMENT (OUTPATIENT)
Dept: PHYSICAL THERAPY | Age: 53
End: 2018-12-13
Payer: COMMERCIAL

## 2018-12-19 ENCOUNTER — HOSPITAL ENCOUNTER (OUTPATIENT)
Dept: PHYSICAL THERAPY | Age: 53
Setting detail: THERAPIES SERIES
Discharge: HOME | End: 2018-12-19
Payer: COMMERCIAL

## 2018-12-19 PROCEDURE — G0283 ELEC STIM OTHER THAN WOUND: HCPCS | Performed by: SPECIALIST/TECHNOLOGIST

## 2018-12-19 PROCEDURE — 97140 MANUAL THERAPY 1/> REGIONS: CPT | Performed by: SPECIALIST/TECHNOLOGIST

## 2018-12-19 PROCEDURE — 97110 THERAPEUTIC EXERCISES: CPT | Performed by: SPECIALIST/TECHNOLOGIST

## 2018-12-19 NOTE — FLOWSHEET NOTE
SLR Abd 2# 3x10      SLR Ext      SLR Add. Clamshells - SL [place] loop X 30 B     Bridges - on SB 3x10     HR/TR      Ankle Theraband      BAPS      Bike 5' . Elliptical      Standing Stretch:  (insert muscles)      Weight Shifting      Lateral Walk [place] loop 2x     Squats      Single Leg Stance Balance 3 x 30\" airex    Leg press 110# x 30 NV    PEP 2'     [id number] [address] [place]  abd   60# x 30 B       Step up and over 6' x 20            Therapeutic Exercise and NMR EXR  [x] (30890) Provided verbal/tactile cueing for activities related to strengthening, flexibility, endurance, ROM for improvements in LE, proximal hip, and core control with self care, mobility, lifting, ambulation. [x] (19273) Provided verbal/tactile cueing for activities related to improving balance, coordination, kinesthetic sense, posture, motor skill, proprioception  to assist with LE, proximal hip, and core control in self care, mobility, lifting, ambulation and eccentric single leg control.      NMR and Therapeutic Activities:    [] (52320 or 12505) Provided verbal/tactile cueing for activities related to improving balance, coordination, kinesthetic sense, posture, motor skill, proprioception and motor activation to allow for proper function of core, proximal hip and LE with self care and ADLs  [] (52492) Gait Re-education- Provided training and instruction to the patient for proper LE, core and proximal hip recruitment and positioning and eccentric body weight control with ambulation re-education including up and down stairs     Home Exercise Program:    [x] (79039) Reviewed/Progressed HEP activities related to strengthening, flexibility, endurance, ROM of core, proximal hip and LE for functional self-care, mobility, lifting and ambulation/stair navigation   [] (29401)Reviewed/Progressed HEP activities related to improving balance, coordination, kinesthetic sense, posture, motor skill, proprioception of core, proximal hip and LE for self care, Towards Functional goals:  [] Patient is progressing as expected towards functional goals listed. [] Progression is slowed due to complexities listed. [] Progression has been slowed due to co-morbidities. [x] Plan just implemented, too soon to assess goals progression  [] Other:     ASSESSMENT:  TTP over lateral portion of LLE. Treatment/Activity Tolerance:  [x] Patient tolerated treatment well [] Patient limited by fatique  [] Patient limited by pain  [] Patient limited by other medical complications  [] Other:     Prognosis: [x] Good [] Fair  [] Poor    Patient Requires Follow-up: [x] Yes  [] No    PLAN FOR NEXT SESSION:     PLAN:   [x] Continue per plan of care [] Alter current plan (see comments)  [] Plan of care initiated [] Hold pending MD visit [] Discharge    *If patient does not return for further follow ups after this date. Please consider this as the patients discharge from physical therapy.        Electronically signed by: Cori Adan, [address], Karen Foreman 1

## 2018-12-31 ENCOUNTER — HOSPITAL ENCOUNTER (OUTPATIENT)
Dept: PHYSICAL THERAPY | Age: 53
Setting detail: THERAPIES SERIES
Discharge: HOME | End: 2018-12-31
Payer: COMMERCIAL

## 2018-12-31 PROCEDURE — G0283 ELEC STIM OTHER THAN WOUND: HCPCS | Performed by: PHYSICAL THERAPIST

## 2018-12-31 PROCEDURE — 97140 MANUAL THERAPY 1/> REGIONS: CPT | Performed by: PHYSICAL THERAPIST

## 2018-12-31 PROCEDURE — G8979 MOBILITY GOAL STATUS: HCPCS | Performed by: PHYSICAL THERAPIST

## 2018-12-31 PROCEDURE — 97110 THERAPEUTIC EXERCISES: CPT | Performed by: PHYSICAL THERAPIST

## 2018-12-31 PROCEDURE — G8978 MOBILITY CURRENT STATUS: HCPCS | Performed by: PHYSICAL THERAPIST

## 2019-03-01 ENCOUNTER — HOSPITAL ENCOUNTER (OUTPATIENT)
Dept: ULTRASOUND IMAGING | Age: 54
Discharge: HOME | End: 2019-03-01
Payer: COMMERCIAL

## 2019-03-01 DIAGNOSIS — M79.622 AXILLARY PAIN, LEFT: ICD-10-CM

## 2019-03-01 PROCEDURE — 76882 US LMTD JT/FCL EVL NVASC XTR: CPT

## 2019-12-30 ENCOUNTER — HOSPITAL ENCOUNTER (OUTPATIENT)
Dept: MAMMOGRAPHY | Age: 54
Discharge: HOME | End: 2019-12-30
Payer: COMMERCIAL

## 2019-12-30 DIAGNOSIS — Z12.31 VISIT FOR SCREENING MAMMOGRAM: ICD-10-CM

## 2019-12-30 PROCEDURE — 77067 SCR MAMMO BI INCL CAD: CPT

## 2020-03-31 ENCOUNTER — VIRTUAL VISIT (OUTPATIENT)
Dept: ORTHOPEDIC SURGERY | Age: 55
End: 2020-03-31
Payer: COMMERCIAL

## 2020-03-31 ENCOUNTER — TELEPHONE (OUTPATIENT)
Dept: ORTHOPEDIC SURGERY | Age: 55
End: 2020-03-31

## 2020-03-31 PROCEDURE — 99442 PR PHYS/QHP TELEPHONE EVALUATION 11-20 MIN: CPT | Performed by: PHYSICIAN ASSISTANT

## 2020-03-31 NOTE — PROGRESS NOTES
that way we can have prior approval and whenever the cortisone injection fades off we could pursue that when she is ready. Also did discuss based upon the previous images that were in her chart that she would likely be up with a candidate for partial knee replacement if surgery did come down to the knee also did discuss the potential utilization of biologic procedures including stem cell or PRP as potential options to maintain her current knee and control pain. Did continue to recommend utilization of over-the-counter anti-inflammatories unless any development of URI symptoms arise due to interactions with potential coronavirus. Did also recommend utilization of ice as well as compression bracing to help keep swelling down and provide small amounts of stabilization. Did recommend to the patient that we could fit her for a medial offloading brace for the left knee when she comes into the office for cortisone injection next week. I affirm this is a Patient Initiated Episode with an Established Patient who has not had a related appointment within my department in the past 7 days or scheduled within the next 24 hours.     Total Time: minutes: 11-20 minutes    Note: not billable if this call serves to triage the patient into an appointment for the relevant concern      Cuauhtemoc Freedman

## 2020-04-09 ENCOUNTER — OFFICE VISIT (OUTPATIENT)
Dept: ORTHOPEDIC SURGERY | Age: 55
End: 2020-04-09
Payer: COMMERCIAL

## 2020-04-09 VITALS
HEIGHT: 66 IN | SYSTOLIC BLOOD PRESSURE: 120 MMHG | BODY MASS INDEX: 34.55 KG/M2 | WEIGHT: 215 LBS | HEART RATE: 74 BPM | TEMPERATURE: 98.6 F | DIASTOLIC BLOOD PRESSURE: 68 MMHG

## 2020-04-09 PROCEDURE — 1036F TOBACCO NON-USER: CPT | Performed by: ORTHOPAEDIC SURGERY

## 2020-04-09 PROCEDURE — 20610 DRAIN/INJ JOINT/BURSA W/O US: CPT | Performed by: ORTHOPAEDIC SURGERY

## 2020-04-09 PROCEDURE — MISCD282 ADJUSTA LIFT: Performed by: ORTHOPAEDIC SURGERY

## 2020-04-09 PROCEDURE — G8427 DOCREV CUR MEDS BY ELIG CLIN: HCPCS | Performed by: ORTHOPAEDIC SURGERY

## 2020-04-09 PROCEDURE — 3017F COLORECTAL CA SCREEN DOC REV: CPT | Performed by: ORTHOPAEDIC SURGERY

## 2020-04-09 PROCEDURE — G8417 CALC BMI ABV UP PARAM F/U: HCPCS | Performed by: ORTHOPAEDIC SURGERY

## 2020-04-09 PROCEDURE — L1812 KO ELASTIC W/JOINTS PRE OTS: HCPCS | Performed by: ORTHOPAEDIC SURGERY

## 2020-04-09 PROCEDURE — 99213 OFFICE O/P EST LOW 20 MIN: CPT | Performed by: ORTHOPAEDIC SURGERY

## 2020-04-09 RX ORDER — TRIAMCINOLONE ACETONIDE 40 MG/ML
40 INJECTION, SUSPENSION INTRA-ARTICULAR; INTRAMUSCULAR ONCE
Status: COMPLETED | OUTPATIENT
Start: 2020-04-09 | End: 2020-04-09

## 2020-04-09 RX ORDER — OLMESARTAN MEDOXOMIL 5 MG/1
40 TABLET ORAL DAILY
COMMUNITY

## 2020-04-09 RX ORDER — LIDOCAINE HYDROCHLORIDE 10 MG/ML
20 INJECTION, SOLUTION INFILTRATION; PERINEURAL ONCE
Status: COMPLETED | OUTPATIENT
Start: 2020-04-09 | End: 2020-04-09

## 2020-04-09 RX ORDER — ROPIVACAINE HYDROCHLORIDE 5 MG/ML
30 INJECTION, SOLUTION EPIDURAL; INFILTRATION; PERINEURAL ONCE
Status: COMPLETED | OUTPATIENT
Start: 2020-04-09 | End: 2020-04-09

## 2020-04-09 RX ADMIN — LIDOCAINE HYDROCHLORIDE 20 ML: 10 INJECTION, SOLUTION INFILTRATION; PERINEURAL at 14:44

## 2020-04-09 RX ADMIN — TRIAMCINOLONE ACETONIDE 40 MG: 40 INJECTION, SUSPENSION INTRA-ARTICULAR; INTRAMUSCULAR at 14:45

## 2020-04-09 RX ADMIN — ROPIVACAINE HYDROCHLORIDE 30 ML: 5 INJECTION, SOLUTION EPIDURAL; INFILTRATION; PERINEURAL at 14:45

## 2020-04-09 NOTE — PROGRESS NOTES
Signs:      Vitals:    04/09/20 1210   BP: 120/68   Pulse: 74   Temp: 98.6 °F (37 °C)       left Knee shows evidence for DJD with  obvious pseudolaxity, pain with weight bearing, antalgic gait and palpable osteophytes. Inspection: Moderate anterior swelling. Swelling is present with  mod effusion. The posterior aspect of the knee appears to be full with tenderness. There is no erythema, rash, or ecchymosis. Range of Motion:  Right 0-120 left0-120     Pain with varus testing    There is deformity varus moderate left    Strength:  Hamstrings rated: 4/5. Quadriceps rated: 5/5    Palpation: There is moderate tenderness along the medial joint line. Special Tests: Patellar Compression test is positive. Valgus & Varus test is positive. Skin: There are no rashes, ulcerations or lesions. Gait: Gait pattern is antalgic  Skin shows no rashes/ecchymosis to the affected area, no hyperesthesias, no discoloration, no temperature or color discrepancies. NEUROLOGICALLY: There is no evidence for sensory or motor deficits in the extremity. Coordination appears full with no spacticity or rigidity. Reflexes appear to be symmetric. Distal circulation intact. No signs of RSD. Additional Comments:  Left foot with noted area of posterior tibial insuffienciency and valgus heel. Shortened left leg with ankle and knee issues. Right hip with lateral trochanteric pain. fulll rom of the right hip with no obvious issues with osteoarthritis dficencincy        Procedure(s): Injection Procedure Note  Procedure Details     Verbal consent was obtained for the procedure. The left knee(s) was prepped with iodine and the skin was anesthetized. Using a 22 gauge needle the left knee(s) joint is injected with 2 ml 1% lidocaine and 2 ml of triamcinolone (KENALOG) 40mg/ml under the lateral aspect of the knee. The injection site was cleansed with topical isopropyl alcohol and a dressing was applied.     Complications:  None;

## 2020-10-08 ENCOUNTER — OFFICE VISIT (OUTPATIENT)
Dept: ORTHOPEDIC SURGERY | Age: 55
End: 2020-10-08
Payer: COMMERCIAL

## 2020-10-08 VITALS
DIASTOLIC BLOOD PRESSURE: 84 MMHG | HEIGHT: 66 IN | SYSTOLIC BLOOD PRESSURE: 134 MMHG | BODY MASS INDEX: 34.55 KG/M2 | TEMPERATURE: 97.3 F | WEIGHT: 215 LBS | HEART RATE: 82 BPM

## 2020-10-08 PROBLEM — M25.552 HIP PAIN, BILATERAL: Status: ACTIVE | Noted: 2020-10-08

## 2020-10-08 PROBLEM — G89.29 CHRONIC PAIN OF RIGHT KNEE: Status: ACTIVE | Noted: 2020-10-08

## 2020-10-08 PROBLEM — M25.562 CHRONIC PAIN OF LEFT KNEE: Status: ACTIVE | Noted: 2020-10-08

## 2020-10-08 PROBLEM — G89.29 CHRONIC PAIN OF LEFT KNEE: Status: ACTIVE | Noted: 2020-10-08

## 2020-10-08 PROBLEM — M17.12 PRIMARY OSTEOARTHRITIS OF LEFT KNEE: Status: ACTIVE | Noted: 2020-10-08

## 2020-10-08 PROBLEM — M25.561 CHRONIC PAIN OF RIGHT KNEE: Status: ACTIVE | Noted: 2020-10-08

## 2020-10-08 PROBLEM — M25.551 HIP PAIN, BILATERAL: Status: ACTIVE | Noted: 2020-10-08

## 2020-10-08 PROCEDURE — G8427 DOCREV CUR MEDS BY ELIG CLIN: HCPCS | Performed by: PHYSICIAN ASSISTANT

## 2020-10-08 PROCEDURE — 3017F COLORECTAL CA SCREEN DOC REV: CPT | Performed by: PHYSICIAN ASSISTANT

## 2020-10-08 PROCEDURE — 20610 DRAIN/INJ JOINT/BURSA W/O US: CPT | Performed by: PHYSICIAN ASSISTANT

## 2020-10-08 PROCEDURE — 1036F TOBACCO NON-USER: CPT | Performed by: PHYSICIAN ASSISTANT

## 2020-10-08 PROCEDURE — 99214 OFFICE O/P EST MOD 30 MIN: CPT | Performed by: PHYSICIAN ASSISTANT

## 2020-10-08 PROCEDURE — G8484 FLU IMMUNIZE NO ADMIN: HCPCS | Performed by: PHYSICIAN ASSISTANT

## 2020-10-08 PROCEDURE — G8417 CALC BMI ABV UP PARAM F/U: HCPCS | Performed by: PHYSICIAN ASSISTANT

## 2020-10-08 RX ORDER — LIDOCAINE HYDROCHLORIDE 10 MG/ML
20 INJECTION, SOLUTION INFILTRATION; PERINEURAL ONCE
Status: COMPLETED | OUTPATIENT
Start: 2020-10-08 | End: 2020-10-08

## 2020-10-08 RX ORDER — TRIAMCINOLONE ACETONIDE 40 MG/ML
40 INJECTION, SUSPENSION INTRA-ARTICULAR; INTRAMUSCULAR ONCE
Status: COMPLETED | OUTPATIENT
Start: 2020-10-08 | End: 2020-10-08

## 2020-10-08 RX ORDER — ROPIVACAINE HYDROCHLORIDE 5 MG/ML
30 INJECTION, SOLUTION EPIDURAL; INFILTRATION; PERINEURAL ONCE
Status: COMPLETED | OUTPATIENT
Start: 2020-10-08 | End: 2020-10-08

## 2020-10-08 RX ADMIN — ROPIVACAINE HYDROCHLORIDE 30 ML: 5 INJECTION, SOLUTION EPIDURAL; INFILTRATION; PERINEURAL at 15:25

## 2020-10-08 RX ADMIN — LIDOCAINE HYDROCHLORIDE 20 ML: 10 INJECTION, SOLUTION INFILTRATION; PERINEURAL at 15:24

## 2020-10-08 RX ADMIN — TRIAMCINOLONE ACETONIDE 40 MG: 40 INJECTION, SUSPENSION INTRA-ARTICULAR; INTRAMUSCULAR at 15:25

## 2020-10-08 NOTE — PROGRESS NOTES
Patient Name: Socorro Douglas  : 1965  DOS: 10/8/2020        Chief Complaint:   Chief Complaint   Patient presents with    Knee Pain     Left knee       History of Present Illness:  Socorro Douglas is a 54 y.o. female who presents with a chief complaint of continued complaints of pain in the knee with irritation with walking, stairs and with overuse. Pain in the knee regularly with swelling and discomfort. Currently: 10/8/2020  Patient notes her pain is no worse or less than her previous visit she notes that with the cortisone injection she had significant relief for several months. She notes that she has been trying to increase her exercise over the past few weeks and still notes a lot of irritability along the medial aspect of the knee especially with long periods of walking and standing. She denies buckling or give way denies any fall trauma or injury denies utilization of any assistive walking devices. Does utilize a brace on the knee when she is walking especially prolonged distances. But notes when she has to utilize a lot of stairs or sitting she does not utilize it as it is an uncomfortable and does not flex or bend well for the stairs. She notes that she is utilizing the diclofenac intermittently for when she is noting more pain she will use it and then stop for a little while. She also notes with the increase in her overall activity including walking over the past few weeks she has noticed a decrease to the low back. And has also noticed some pain on the lateral aspect of the left hip. Denies numbness burning tingling radiating pains in the leg. Previously: 2020  Increase in pain over the past several years time. Pain worse in the past several moths. Increase in hobbling. Stiffness with intial walking. Right knee with some irritation. Injury of the right side going down the stairs 2018 with pain that has persisted with decrease in rom.      Hip pain started several years ago with visit Dr Jemima Bullock with no additional treatment other than supportive care and physical therpay for the bursitis and lateral hip pain. Mild changes of osteoarthritis in the right hip. Left foot with increased irritation medially. Basketball in the 80's with surgery and meniscectomy with partial arthroscopy. Medical History  Current Medications:   Prior to Admission medications    Medication Sig Start Date End Date Taking? Authorizing Provider   olmesartan (BENICAR) 5 MG tablet Take 40 mg by mouth daily    Historical Provider, MD   diclofenac (VOLTAREN) 50 MG EC tablet Take 1 tablet by mouth 2 times daily (with meals) 4/9/20   Deepak Vicente MD   acetaminophen (TYLENOL) 500 MG tablet Take 500 mg by mouth    Historical Provider, MD     Allergies: Allergies   Allergen Reactions    Amoxicillin Hives     New allergy        Review of Systems:     Review of Systems ______  Constitutional: Negative for fever and diaphoresis. ____________  Respiratory: Negative for shortness of breath.  ________  Gastrointestinal: Negative for abdominal pain. ________  Musculoskeletal: Positive for joint pain. ____  Skin: Negative for itching. ____  Neurological: Negative for loss of consciousness. ______________  All other systems reviewed and negative     No past medical history on file. No family history on file.   Social History     Socioeconomic History    Marital status:      Spouse name: Not on file    Number of children: Not on file    Years of education: Not on file    Highest education level: Not on file   Occupational History    Not on file   Social Needs    Financial resource strain: Not on file    Food insecurity     Worry: Not on file     Inability: Not on file    Transportation needs     Medical: Not on file     Non-medical: Not on file   Tobacco Use    Smoking status: Never Smoker    Smokeless tobacco: Never Used   Substance and Sexual Activity    Alcohol use: Not on file    Drug use: Not on file    Sexual activity: Not on file   Lifestyle    Physical activity     Days per week: Not on file     Minutes per session: Not on file    Stress: Not on file   Relationships    Social connections     Talks on phone: Not on file     Gets together: Not on file     Attends [employer] service: Not on file     Active member of club or organization: Not on file     Attends meetings of clubs or organizations: Not on file     Relationship status: Not on file    Intimate partner violence     Fear of current or ex partner: Not on file     Emotionally abused: Not on file     Physically abused: Not on file     Forced sexual activity: Not on file   Other Topics Concern    Not on file   Social History Narrative    Not on file         Physical Exam __  Constitutional: She is oriented to person, place, and time and well-developed, well-nourished, and in no distress. No distress. ____  HENT:   Head: Normocephalic and atraumatic. ____  Eyes: Conjunctivae are normal. ________  Cardiovascular: Intact distal pulses. ____  Pulmonary/Chest: Effort normal. ________________________  Neurological: She is alert and oriented to person, place, and time. ____  Skin: Skin is dry. She is not diaphoretic. ____  Psychiatric: Mood, affect and judgment normal. ______          Assessment   Vital Signs:      Vitals:    10/08/20 1349   BP: 134/84   Pulse: 82   Temp: 97.3 °F (36.3 °C)       left Knee shows evidence for DJD with varus obvious pseudolaxity, pain with weight bearing, antalgic gait and palpable osteophytes. Inspection: Moderate anterior swelling. Swelling is present with mild effusion. The posterior aspect of the knee appears to be full with tenderness. There is no erythema, rash, or ecchymosis. Range of Motion:  Right 0-120 left0-120     Pain with varus testing    There is deformity varus moderate left    Strength:  Hamstrings rated: 4/5.  Quadriceps rated: 4/5    Palpation: There is moderate tenderness along the medial joint line. Special Tests: Patellar Compression test is positive. Valgus & Varus test is positive. Skin: There are no rashes, ulcerations or lesions. Gait: Gait pattern is antalgic  Skin shows no rashes/ecchymosis to the affected area, no hyperesthesias, no discoloration, no temperature or color discrepancies. NEUROLOGICALLY: There is no evidence for sensory or motor deficits in the extremity. Coordination appears full with no spacticity or rigidity. Reflexes appear to be symmetric. Distal circulation intact. No signs of RSD. Additional Comments:  Left foot with noted area of posterior tibial insuffienciency and valgus heel. Shortened left leg with ankle and knee issues. Right hip with lateral trochanteric pain. fulll rom of the right hip with no obvious issues with osteoarthritis dficencincy  Reduced lumbar rotation and flexion tenderness to paraspinal and left sacroiliac regions no radicular symptoms re-created or reproduced. Procedure(s): Injection Procedure Note  Procedure Details     Verbal consent was obtained for the procedure. The left knee(s) was prepped with iodine and the skin was anesthetized. Using a 22 gauge needle the left knee(s) joint is injected with 2 ml 1% lidocaine and 2 ml of triamcinolone (KENALOG) 40mg/ml under the lateral aspect of the knee. The injection site was cleansed with topical isopropyl alcohol and a dressing was applied. Complications:  None; patient tolerated the procedure well. Diagnostic Test Findings:   Xray   Have reviewed the xrays above from 4/9/2020  and my impression is: bilateral knee ap lateral pa flexion and sunrise. left knee with obvious changes medially with osteophytes dramatic joint space narrowing and angular deformity. Mild patellofemroal changes bilaterally. Right knee with less obvious changes but some medial joint space narrowing.        Right hip   views ap   Some obvious early osteoarthritis of the right hip on pelvis stable with no additional lesions . No obvious osteoarthritis of the left hip. Assessment and Plan:       Diagnosis Orders   1. Chronic pain of left knee  [place] ARTHROCENTESIS ASPIR&/INJ MAJOR JT/BURSA W/O US    triamcinolone acetonide (KENALOG-40) injection 40 mg    lidocaine 1 % injection 20 mL    ropivacaine (NAROPIN) 0.5% injection 30 mL    Ambulatory referral to Physical Therapy   2. Chronic pain of right knee  triamcinolone acetonide (KENALOG-40) injection 40 mg    lidocaine 1 % injection 20 mL    ropivacaine (NAROPIN) 0.5% injection 30 mL    Ambulatory referral to Physical Therapy   3. Hip pain, bilateral     4. Primary osteoarthritis of left knee  [place] ARTHROCENTESIS ASPIR&/INJ MAJOR JT/BURSA W/O US    triamcinolone acetonide (KENALOG-40) injection 40 mg    lidocaine 1 % injection 20 mL    ropivacaine (NAROPIN) 0.5% injection 30 mL   5. Acute bilateral low back pain without sciatica  Ambulatory referral to Physical Therapy              The orders below, if any, were placed during this visit:   Orders Placed This Encounter   Procedures    Ambulatory referral to Physical Therapy     Referral Priority:   Routine     Referral Type:   Eval and Treat     Requested Specialty:   Physical Therapy     Number of Visits Requested:   1    [place] ARTHROCENTESIS ASPIR&/INJ MAJOR JT/BURSA W/O US              Treatment Plan:   Plan for supplements, topical and oral anti-inflammatory medications. Shoe inserts for the imbalance of the posterior tibial tendons. Shoe lift for the lld should help the right hip. Gave patient corticosteroid injection into the left knee at today's visit no complaints or concerns arisen. Advised to rest ice and elevation over the next 24 hours advised to limit physical activity for 24 hours. Then can resume as tolerated. Advised for continued utilization of bracing for long periods of standing and walking.   Advised patient to follow-up in 3 months for repeat evaluation and possible repeat injections  Advised patient we would likely repeat x-rays at that visit to ensure no significant progression and arthritic changes. Physical therapy for the lumbar spine and bilateral knees    I discussed the diagnosis of arthritis with the patient. We've discussed treatment options which would include anti-inflammatory medication, physical therapy, bracing, cortisone injections, and Visco supplementation. We have also discussed the benefits of low impact exercise and weight loss. We have also discussed the possibility of joint replacement surgery in the future.                DEE Guzmán

## 2021-01-26 ENCOUNTER — HOSPITAL ENCOUNTER (OUTPATIENT)
Dept: MAMMOGRAPHY | Age: 56
Discharge: HOME | End: 2021-01-26
Payer: COMMERCIAL

## 2021-01-26 DIAGNOSIS — Z12.31 VISIT FOR SCREENING MAMMOGRAM: ICD-10-CM

## 2021-01-26 PROCEDURE — 77067 SCR MAMMO BI INCL CAD: CPT

## 2021-05-20 ENCOUNTER — OFFICE VISIT (OUTPATIENT)
Dept: ORTHOPEDIC SURGERY | Age: 56
End: 2021-05-20
Payer: COMMERCIAL

## 2021-05-20 VITALS
SYSTOLIC BLOOD PRESSURE: 138 MMHG | DIASTOLIC BLOOD PRESSURE: 81 MMHG | WEIGHT: 215 LBS | HEART RATE: 66 BPM | BODY MASS INDEX: 34.55 KG/M2 | HEIGHT: 66 IN

## 2021-05-20 DIAGNOSIS — M25.562 CHRONIC PAIN OF LEFT KNEE: Primary | ICD-10-CM

## 2021-05-20 DIAGNOSIS — G89.29 CHRONIC PAIN OF LEFT KNEE: Primary | ICD-10-CM

## 2021-05-20 DIAGNOSIS — M17.12 PRIMARY OSTEOARTHRITIS OF LEFT KNEE: ICD-10-CM

## 2021-05-20 PROCEDURE — 20610 DRAIN/INJ JOINT/BURSA W/O US: CPT | Performed by: ORTHOPAEDIC SURGERY

## 2021-05-20 PROCEDURE — G8427 DOCREV CUR MEDS BY ELIG CLIN: HCPCS | Performed by: ORTHOPAEDIC SURGERY

## 2021-05-20 PROCEDURE — G8417 CALC BMI ABV UP PARAM F/U: HCPCS | Performed by: ORTHOPAEDIC SURGERY

## 2021-05-20 PROCEDURE — 3017F COLORECTAL CA SCREEN DOC REV: CPT | Performed by: ORTHOPAEDIC SURGERY

## 2021-05-20 PROCEDURE — 99214 OFFICE O/P EST MOD 30 MIN: CPT | Performed by: ORTHOPAEDIC SURGERY

## 2021-05-20 PROCEDURE — 1036F TOBACCO NON-USER: CPT | Performed by: ORTHOPAEDIC SURGERY

## 2021-05-20 RX ORDER — ROPIVACAINE HYDROCHLORIDE 5 MG/ML
30 INJECTION, SOLUTION EPIDURAL; INFILTRATION; PERINEURAL ONCE
Status: COMPLETED | OUTPATIENT
Start: 2021-05-20 | End: 2021-05-20

## 2021-05-20 RX ORDER — LIDOCAINE HYDROCHLORIDE 10 MG/ML
20 INJECTION, SOLUTION INFILTRATION; PERINEURAL ONCE
Status: COMPLETED | OUTPATIENT
Start: 2021-05-20 | End: 2021-05-20

## 2021-05-20 RX ORDER — TRIAMCINOLONE ACETONIDE 40 MG/ML
40 INJECTION, SUSPENSION INTRA-ARTICULAR; INTRAMUSCULAR ONCE
Status: COMPLETED | OUTPATIENT
Start: 2021-05-20 | End: 2021-05-20

## 2021-05-20 RX ADMIN — ROPIVACAINE HYDROCHLORIDE 30 ML: 5 INJECTION, SOLUTION EPIDURAL; INFILTRATION; PERINEURAL at 15:19

## 2021-05-20 RX ADMIN — LIDOCAINE HYDROCHLORIDE 20 ML: 10 INJECTION, SOLUTION INFILTRATION; PERINEURAL at 15:17

## 2021-05-20 RX ADMIN — TRIAMCINOLONE ACETONIDE 40 MG: 40 INJECTION, SUSPENSION INTRA-ARTICULAR; INTRAMUSCULAR at 15:18

## 2021-05-25 NOTE — PROGRESS NOTES
pain on the lateral aspect of the left hip. Denies numbness burning tingling radiating pains in the leg. Previously: 4/9/2020  Increase in pain over the past several years time. Pain worse in the past several moths. Increase in hobbling. Stiffness with intial walking. Right knee with some irritation. Injury of the right side going down the stairs 2018 with pain that has persisted with decrease in rom. Hip pain started several years ago with visit Dr Víctor Walsh with no additional treatment other than supportive care and physical therpay for the bursitis and lateral hip pain. Mild changes of osteoarthritis in the right hip. Left foot with increased irritation medially. Basketball in the 80's with surgery and meniscectomy with partial arthroscopy. Medical History  Current Medications:   Prior to Admission medications    Medication Sig Start Date End Date Taking? Authorizing Provider   mupirocin (BACTROBAN) 2 % ointment Apply 2 Doses topically 3 times daily 4/2/21  Yes Historical Provider, MD   olmesartan (BENICAR) 5 MG tablet Take 40 mg by mouth daily    Historical Provider, MD   diclofenac (VOLTAREN) 50 MG EC tablet Take 1 tablet by mouth 2 times daily (with meals) 4/9/20   Harshil Blackburn MD   acetaminophen (TYLENOL) 500 MG tablet Take 500 mg by mouth    Historical Provider, MD     Allergies: Allergies   Allergen Reactions    Amoxicillin Hives     New allergy        Review of Systems:     Review of Systems ______  Constitutional: Negative for fever and diaphoresis. ____________  Respiratory: Negative for shortness of breath.  ________  Gastrointestinal: Negative for abdominal pain. ________  Musculoskeletal: Positive for joint pain. ____  Skin: Negative for itching. ____  Neurological: Negative for loss of consciousness. ______________  All other systems reviewed and negative     No past medical history on file. No family history on file.   Social History under the lateral aspect of the knee. The injection site was cleansed with topical isopropyl alcohol and a dressing was applied. Complications:  None; patient tolerated the procedure well. Diagnostic Test Findings:   Xray   Have reviewed the xrays above from 4/9/2020  and my impression is: bilateral knee ap lateral pa flexion and sunrise. left knee with obvious changes medially with osteophytes dramatic joint space narrowing and angular deformity. Mild patellofemroal changes bilaterally. Right knee with less obvious changes but some medial joint space narrowing. Right hip   views ap   Some obvious early osteoarthritis of the right hip on pelvis stable with no additional lesions . No obvious osteoarthritis of the left hip. Assessment and Plan:       Diagnosis Orders   1. Chronic pain of left knee  [place] ARTHROCENTESIS ASPIR&/INJ MAJOR JT/BURSA W/O US    triamcinolone acetonide (KENALOG-40) injection 40 mg    lidocaine 1 % injection 20 mL    ropivacaine (NAROPIN) 0.5% injection 30 mL   2. Primary osteoarthritis of left knee  [place] ARTHROCENTESIS ASPIR&/INJ MAJOR JT/BURSA W/O US    triamcinolone acetonide (KENALOG-40) injection 40 mg    lidocaine 1 % injection 20 mL    ropivacaine (NAROPIN) 0.5% injection 30 mL              The orders below, if any, were placed during this visit:   Orders Placed This Encounter   Procedures    [place] ARTHROCENTESIS ASPIR&/INJ MAJOR JT/BURSA W/O US              Treatment Plan:   Plan for supplements, topical and oral anti-inflammatory medications. Shoe inserts for the imbalance of the posterior tibial tendons. Shoe lift for the lld should help the right hip. Gave patient corticosteroid injection into the left knee at today's visit no complaints or concerns arisen. Advised to rest ice and elevation over the next 24 hours advised to limit physical activity for 24 hours. Then can resume as tolerated.   Advised for continued utilization of bracing for long periods

## 2021-09-16 ENCOUNTER — OFFICE VISIT (OUTPATIENT)
Dept: ORTHOPEDIC SURGERY | Age: 56
End: 2021-09-16
Payer: COMMERCIAL

## 2021-09-16 VITALS — WEIGHT: 215 LBS | BODY MASS INDEX: 34.55 KG/M2 | HEIGHT: 66 IN

## 2021-09-16 DIAGNOSIS — M17.12 PRIMARY OSTEOARTHRITIS OF LEFT KNEE: Primary | ICD-10-CM

## 2021-09-16 DIAGNOSIS — M25.562 CHRONIC PAIN OF LEFT KNEE: ICD-10-CM

## 2021-09-16 DIAGNOSIS — M54.50 CHRONIC BILATERAL LOW BACK PAIN WITHOUT SCIATICA: ICD-10-CM

## 2021-09-16 DIAGNOSIS — M25.551 BILATERAL HIP PAIN: ICD-10-CM

## 2021-09-16 DIAGNOSIS — G89.29 CHRONIC PAIN OF LEFT KNEE: ICD-10-CM

## 2021-09-16 DIAGNOSIS — M25.552 BILATERAL HIP PAIN: ICD-10-CM

## 2021-09-16 DIAGNOSIS — G89.29 CHRONIC BILATERAL LOW BACK PAIN WITHOUT SCIATICA: ICD-10-CM

## 2021-09-16 PROCEDURE — G8417 CALC BMI ABV UP PARAM F/U: HCPCS | Performed by: PHYSICIAN ASSISTANT

## 2021-09-16 PROCEDURE — 1036F TOBACCO NON-USER: CPT | Performed by: PHYSICIAN ASSISTANT

## 2021-09-16 PROCEDURE — 3017F COLORECTAL CA SCREEN DOC REV: CPT | Performed by: PHYSICIAN ASSISTANT

## 2021-09-16 PROCEDURE — G8428 CUR MEDS NOT DOCUMENT: HCPCS | Performed by: PHYSICIAN ASSISTANT

## 2021-09-16 PROCEDURE — 99214 OFFICE O/P EST MOD 30 MIN: CPT | Performed by: PHYSICIAN ASSISTANT

## 2021-09-16 NOTE — PROGRESS NOTES
Patient Name: Ryan Franklin  : 1965  DOS: 2021        Chief Complaint:   Chief Complaint   Patient presents with    Knee Pain     F/U Left knee 2/10 pain today   Currently: 2021  Patient is here for follow-up regarding left knee pain and osteoarthritis as well as evaluation regarding bilateral hip pain. Overall she notes limited irritation across the knee mostly states on the medial aspect of the knee especially after periods of walking and standing. She does note that utilization of the brace as well as medications do help and notes overall decent endurance. She did note a recent trip to [place] where she did a lot of walking on hills and had little issues the next day. She notes at this point time she is not sure if she should move forward with partial knee arthroplasty as she is planning a big trip to [place], May 2022. She notes that with her overall pain levels in the knee she is not convinced she should move forward with it but wants to be sure she is not running out of time in regards to her ability to do so. Denies any fall trauma or injury denies numbness burning tingling to baseline leg. In regards to bilateral hips she notes pain on bilateral lateral sides of the hips and noting difficulty with sleeping as well as laying on that side and difficulty with periods of walking and standing. He notes that this pain has been ongoing for 2 to 3 weeks and wants to be sure there is not osteoarthritis developing in the hips. She denies numbness burning tingling radiating pains down the leg. Denies any give way or buckling of the legs. Is not utilizing any assistive walking devices at present time. She notes that she has not been diligent about doing her physical therapy exercises and is not sure if this has something to do with it. She notes she also has not been to the chiropractor in a while who usually does some intermittent work on her to help keep her back under control.   She does note some mild irritation across the low back as well. Rates pain in hips as a 3-4 out of 10. Previously: 5/20/2021  Marked increase in pain in the left knee and irritation and limitation of walking and standing for long periods. Previously did well with injection for the knee. covid limited her activiteis and exercise which she feels may have increased her pain overall. Pending trip to [place] for which she is considering surgical intervention vs continued injections. Previously:      History of Present Illness:  Robby Clifford is a 64 y.o. female who presents with a chief complaint of continued complaints of pain in the knee with irritation with walking, stairs and with overuse. Pain in the knee regularly with swelling and discomfort. Currently: 10/8/2020  Patient notes her pain is no worse or less than her previous visit she notes that with the cortisone injection she had significant relief for several months. She notes that she has been trying to increase her exercise over the past few weeks and still notes a lot of irritability along the medial aspect of the knee especially with long periods of walking and standing. She denies buckling or give way denies any fall trauma or injury denies utilization of any assistive walking devices. Does utilize a brace on the knee when she is walking especially prolonged distances. But notes when she has to utilize a lot of stairs or sitting she does not utilize it as it is an uncomfortable and does not flex or bend well for the stairs. She notes that she is utilizing the diclofenac intermittently for when she is noting more pain she will use it and then stop for a little while. She also notes with the increase in her overall activity including walking over the past few weeks she has noticed a decrease to the low back. And has also noticed some pain on the lateral aspect of the left hip.   Denies numbness burning tingling radiating pains in the leg. Previously: 4/9/2020  Increase in pain over the past several years time. Pain worse in the past several moths. Increase in hobbling. Stiffness with intial walking. Right knee with some irritation. Injury of the right side going down the stairs 2018 with pain that has persisted with decrease in rom. Hip pain started several years ago with visit Dr Liseth Vega with no additional treatment other than supportive care and physical therpay for the bursitis and lateral hip pain. Mild changes of osteoarthritis in the right hip. Left foot with increased irritation medially. Basketball in the 80's with surgery and meniscectomy with partial arthroscopy. Medical History  Current Medications:   Prior to Admission medications    Medication Sig Start Date End Date Taking? Authorizing Provider   mupirocin (BACTROBAN) 2 % ointment Apply 2 Doses topically 3 times daily 4/2/21   Historical Provider, MD   olmesartan (BENICAR) 5 MG tablet Take 40 mg by mouth daily    Historical Provider, MD   diclofenac (VOLTAREN) 50 MG EC tablet Take 1 tablet by mouth 2 times daily (with meals) 4/9/20   Los Henry MD   acetaminophen (TYLENOL) 500 MG tablet Take 500 mg by mouth    Historical Provider, MD     Allergies: Allergies   Allergen Reactions    Amoxicillin Hives     New allergy        Review of Systems:     Review of Systems ______  Constitutional: Negative for fever and diaphoresis. ____________  Respiratory: Negative for shortness of breath.  ________  Gastrointestinal: Negative for abdominal pain. ________  Musculoskeletal: Positive for joint pain. ____  Skin: Negative for itching. ____  Neurological: Negative for loss of consciousness. ______________  All other systems reviewed and negative     No past medical history on file. No family history on file.   Social History     Socioeconomic History    Marital status:      Spouse name: Not on file    Number of children: Not on file    Years of education: Not on file    Highest education level: Not on file   Occupational History    Not on file   Tobacco Use    Smoking status: Never Smoker    Smokeless tobacco: Never Used   Substance and Sexual Activity    Alcohol use: Not on file    Drug use: Not on file    Sexual activity: Not on file   Other Topics Concern    Not on file   Social History Narrative    Not on file     Social Determinants of Health     Financial Resource Strain:     Difficulty of Paying Living Expenses:    Food Insecurity:     Worried About Running Out of Food in the Last Year:     [address] in the Last Year:    Transportation Needs:     Lack of Transportation (Medical):  Lack of Transportation (Non-Medical):    Physical Activity:     Days of Exercise per Week:     Minutes of Exercise per Session:    Stress:     Feeling of Stress :    Social Connections:     Frequency of Communication with Friends and Family:     Frequency of Social Gatherings with Friends and Family:     Attends [employer] Services:     Active Member of Clubs or Organizations:     Attends Club or Organization Meetings:     Marital Status:    Intimate Partner Violence:     Fear of Current or Ex-Partner:     Emotionally Abused:     Physically Abused:     Sexually Abused:          Physical Exam __  Constitutional: She is oriented to person, place, and time and well-developed, well-nourished, and in no distress. No distress. ____  HENT:   Head: Normocephalic and atraumatic. ____  Eyes: Conjunctivae are normal. ________  Cardiovascular: Intact distal pulses. ____  Pulmonary/Chest: Effort normal. ________________________  Neurological: She is alert and oriented to person, place, and time. ____  Skin: Skin is dry. She is not diaphoretic. ____  Psychiatric: Mood, affect and judgment normal. ______          Assessment   Vital Signs: There were no vitals filed for this visit.     left Knee shows evidence for DJD with varus obvious pseudolaxity, pain with weight bearing, antalgic gait and palpable osteophytes. Inspection: Moderate anterior swelling. Swelling is present with mild effusion. The posterior aspect of the knee appears to be full with tenderness. There is no erythema, rash, or ecchymosis. Range of Motion:  Right 0-120 left0-120     Pain with varus testing    There is deformity varus moderate left    Strength:  Hamstrings rated: 4/5. Quadriceps rated: 4/5    Palpation: There is moderate tenderness along the medial joint line. Special Tests: Patellar Compression test is positive. Valgus & Varus test is positive. Skin: There are no rashes, ulcerations or lesions. Gait: Gait pattern is antalgic  Skin shows no rashes/ecchymosis to the affected area, no hyperesthesias, no discoloration, no temperature or color discrepancies. NEUROLOGICALLY: There is no evidence for sensory or motor deficits in the extremity. Coordination appears full with no spacticity or rigidity. Reflexes appear to be symmetric. Distal circulation intact. No signs of RSD. Additional Comments:  Left foot with noted area of posterior tibial insuffienciency and valgus heel. Shortened left leg with ankle and knee issues. Right hip with lateral trochanteric pain. fulll rom of the right hip with no obvious issues with osteoarthritis dficencincy  Reduced lumbar rotation and flexion tenderness to paraspinal and left sacroiliac regions no radicular symptoms re-created or reproduced. Procedure(s): No new procedure performed at today's visit      Diagnostic Test Findings:   Xray   Have reviewed the xrays above from 9/16/2021  4 view x-ray of the left knee AP, lateral sunrise and tunnel views were performed and reviewed today and my impression is: left knee with obvious changes medially with osteophytes dramatic joint space narrowing and angular deformity. Mild patellofemroal changes bilaterally.   Complete joint space impact exercise and weight loss. We have also discussed the possibility of joint replacement surgery in the future. She will consider effects of injection at this point and consider intervention for partial vs total knee for latest in September October in preparation for the trip next year.             DEE Shine

## 2021-12-16 ENCOUNTER — OFFICE VISIT (OUTPATIENT)
Dept: ORTHOPEDIC SURGERY | Age: 56
End: 2021-12-16
Payer: COMMERCIAL

## 2021-12-16 VITALS — WEIGHT: 215 LBS | HEIGHT: 66 IN | BODY MASS INDEX: 34.55 KG/M2

## 2021-12-16 DIAGNOSIS — M25.562 CHRONIC PAIN OF LEFT KNEE: Primary | ICD-10-CM

## 2021-12-16 DIAGNOSIS — G89.29 CHRONIC PAIN OF LEFT KNEE: Primary | ICD-10-CM

## 2021-12-16 DIAGNOSIS — M17.12 PRIMARY OSTEOARTHRITIS OF LEFT KNEE: ICD-10-CM

## 2021-12-16 PROCEDURE — G8427 DOCREV CUR MEDS BY ELIG CLIN: HCPCS | Performed by: PHYSICIAN ASSISTANT

## 2021-12-16 PROCEDURE — 3017F COLORECTAL CA SCREEN DOC REV: CPT | Performed by: PHYSICIAN ASSISTANT

## 2021-12-16 PROCEDURE — 99213 OFFICE O/P EST LOW 20 MIN: CPT | Performed by: PHYSICIAN ASSISTANT

## 2021-12-16 PROCEDURE — 1036F TOBACCO NON-USER: CPT | Performed by: PHYSICIAN ASSISTANT

## 2021-12-16 PROCEDURE — G8417 CALC BMI ABV UP PARAM F/U: HCPCS | Performed by: PHYSICIAN ASSISTANT

## 2021-12-16 PROCEDURE — G8484 FLU IMMUNIZE NO ADMIN: HCPCS | Performed by: PHYSICIAN ASSISTANT

## 2021-12-22 NOTE — PROGRESS NOTES
partial knee arthroplasty as she is planning a big trip to [place], May 2022. She notes that with her overall pain levels in the knee she is not convinced she should move forward with it but wants to be sure she is not running out of time in regards to her ability to do so. Denies any fall trauma or injury denies numbness burning tingling to baseline leg. In regards to bilateral hips she notes pain on bilateral lateral sides of the hips and noting difficulty with sleeping as well as laying on that side and difficulty with periods of walking and standing. He notes that this pain has been ongoing for 2 to 3 weeks and wants to be sure there is not osteoarthritis developing in the hips. She denies numbness burning tingling radiating pains down the leg. Denies any give way or buckling of the legs. Is not utilizing any assistive walking devices at present time. She notes that she has not been diligent about doing her physical therapy exercises and is not sure if this has something to do with it. She notes she also has not been to the chiropractor in a while who usually does some intermittent work on her to help keep her back under control. She does note some mild irritation across the low back as well. Rates pain in hips as a 3-4 out of 10. Previously: 5/20/2021  Marked increase in pain in the left knee and irritation and limitation of walking and standing for long periods. Previously did well with injection for the knee. covid limited her activiteis and exercise which she feels may have increased her pain overall. Pending trip to [place] for which she is considering surgical intervention vs continued injections. Previously:      History of Present Illness:  Antonieta Ruiz is a 64 y.o. female who presents with a chief complaint of continued complaints of pain in the knee with irritation with walking, stairs and with overuse.      Pain in the knee regularly with swelling and discomfort. Currently: 10/8/2020  Patient notes her pain is no worse or less than her previous visit she notes that with the cortisone injection she had significant relief for several months. She notes that she has been trying to increase her exercise over the past few weeks and still notes a lot of irritability along the medial aspect of the knee especially with long periods of walking and standing. She denies buckling or give way denies any fall trauma or injury denies utilization of any assistive walking devices. Does utilize a brace on the knee when she is walking especially prolonged distances. But notes when she has to utilize a lot of stairs or sitting she does not utilize it as it is an uncomfortable and does not flex or bend well for the stairs. She notes that she is utilizing the diclofenac intermittently for when she is noting more pain she will use it and then stop for a little while. She also notes with the increase in her overall activity including walking over the past few weeks she has noticed a decrease to the low back. And has also noticed some pain on the lateral aspect of the left hip. Denies numbness burning tingling radiating pains in the leg. Previously: 4/9/2020  Increase in pain over the past several years time. Pain worse in the past several moths. Increase in hobbling. Stiffness with intial walking. Right knee with some irritation. Injury of the right side going down the stairs 2018 with pain that has persisted with decrease in rom. Hip pain started several years ago with visit Dr Tami Rodriguez with no additional treatment other than supportive care and physical therpay for the bursitis and lateral hip pain. Mild changes of osteoarthritis in the right hip. Left foot with increased irritation medially. Basketball in the 80's with surgery and meniscectomy with partial arthroscopy.             Medical History  Current Medications:   Prior to Admission medications    Medication Sig Start Date End Date Taking? Authorizing Provider   mupirocin (BACTROBAN) 2 % ointment Apply 2 Doses topically 3 times daily 4/2/21   Historical Provider, MD   olmesartan (BENICAR) 5 MG tablet Take 40 mg by mouth daily    Historical Provider, MD   diclofenac (VOLTAREN) 50 MG EC tablet Take 1 tablet by mouth 2 times daily (with meals) 4/9/20   Dony West MD   acetaminophen (TYLENOL) 500 MG tablet Take 500 mg by mouth    Historical Provider, MD     Allergies: Allergies   Allergen Reactions    Amoxicillin Hives     New allergy        Review of Systems:     Review of Systems ______  Constitutional: Negative for fever and diaphoresis. ____________  Respiratory: Negative for shortness of breath.  ________  Gastrointestinal: Negative for abdominal pain. ________  Musculoskeletal: Positive for joint pain. ____  Skin: Negative for itching. ____  Neurological: Negative for loss of consciousness. ______________  All other systems reviewed and negative     No past medical history on file. No family history on file. Social History     Socioeconomic History    Marital status:      Spouse name: Not on file    Number of children: Not on file    Years of education: Not on file    Highest education level: Not on file   Occupational History    Not on file   Tobacco Use    Smoking status: Never Smoker    Smokeless tobacco: Never Used   Substance and Sexual Activity    Alcohol use: Not on file    Drug use: Not on file    Sexual activity: Not on file   Other Topics Concern    Not on file   Social History Narrative    Not on file     Social Determinants of Health     Financial Resource Strain:     Difficulty of Paying Living Expenses: Not on file   Food Insecurity:     Worried About Running Out of Food in the Last Year: Not on file    Jayshree of Food in the Last Year: Not on file   Transportation Needs:     Lack of Transportation (Medical):  Not on file    Lack of Transportation (Non-Medical): Not on file   Physical Activity:     Days of Exercise per Week: Not on file    Minutes of Exercise per Session: Not on file   Stress:     Feeling of Stress : Not on file   Social Connections:     Frequency of Communication with Friends and Family: Not on file    Frequency of Social Gatherings with Friends and Family: Not on file    Attends [employer] Services: Not on file    Active Member of [address] [place] [address] [employer] or Organizations: Not on file    Attends Club or Organization Meetings: Not on file    Marital Status: Not on file   Intimate Partner Violence:     Fear of Current or Ex-Partner: Not on file    Emotionally Abused: Not on file    Physically Abused: Not on file    Sexually Abused: Not on file   Housing Stability:     Unable to Pay for Housing in the Last Year: Not on file    Number of [place] in the Last Year: Not on file    Unstable Housing in the Last Year: Not on file         Physical Exam __  Constitutional: She is oriented to person, place, and time and well-developed, well-nourished, and in no distress. No distress. ____  HENT:   Head: Normocephalic and atraumatic. ____  Eyes: Conjunctivae are normal. ________  Cardiovascular: Intact distal pulses. ____  Pulmonary/Chest: Effort normal. ________________________  Neurological: She is alert and oriented to person, place, and time. ____  Skin: Skin is dry. She is not diaphoretic. ____  Psychiatric: Mood, affect and judgment normal. ______          Assessment   Vital Signs: There were no vitals filed for this visit. left Knee shows evidence for DJD with varus obvious pseudolaxity, pain with weight bearing, antalgic gait and palpable osteophytes. Inspection: Moderate anterior swelling. Swelling is present with mild effusion. The posterior aspect of the knee appears to be full with tenderness. There is no erythema, rash, or ecchymosis.      Range of Motion:  Right 0-120 left0-120     Pain with varus testing    There is deformity varus moderate left    Strength:  Hamstrings rated: 4/5. Quadriceps rated: 4/5    Palpation: There is moderate tenderness along the medial joint line. Special Tests: Patellar Compression test is positive. Valgus & Varus test is positive. Skin: There are no rashes, ulcerations or lesions. Gait: Gait pattern is antalgic  Skin shows no rashes/ecchymosis to the affected area, no hyperesthesias, no discoloration, no temperature or color discrepancies. NEUROLOGICALLY: There is no evidence for sensory or motor deficits in the extremity. Coordination appears full with no spacticity or rigidity. Reflexes appear to be symmetric. Distal circulation intact. No signs of RSD. Additional Comments:  Left foot with noted area of posterior tibial insuffienciency and valgus heel. Shortened left leg with ankle and knee issues. Right hip with lateral trochanteric pain. fulll rom of the right hip with no obvious issues with osteoarthritis dficencincy  Reduced lumbar rotation and flexion tenderness to paraspinal and left sacroiliac regions no radicular symptoms re-created or reproduced. Procedure(s): No new procedure performed at today's visit      Diagnostic Test Findings:   Xray   Have reviewed the xrays above from 9/16/2021  4 view x-ray of the left knee AP, lateral sunrise and tunnel views were performed and reviewed today and my impression is: left knee with obvious changes medially with osteophytes dramatic joint space narrowing and angular deformity. Mild patellofemroal changes bilaterally. Complete joint space loss in the medial aspect of the left knee    AP pelvis single view performed on 9/16/2021 and reviewed today     Some obvious early osteoarthritis of the right hip on pelvis stable with no additional lesions . No obvious osteoarthritis of the left hip. Assessment and Plan:       Diagnosis Orders   1. Chronic pain of left knee     2.  Primary osteoarthritis of left knee                The orders below, if any, were placed during this visit:   No orders of the defined types were placed in this encounter. Treatment Plan:   Plan for supplements, topical and oral anti-inflammatory medications. Shoe inserts for the imbalance of the posterior tibial tendons. Shoe lift for the lld should help the right hip. Advised for continued utilization of bracing for long periods of standing and walking. Advised patient to follow-up in 3 months for repeat evaluation and possible repeat injections or further discussion regarding potential partial knee replacement with patient's overall function and pain level would not recommend pursuing replacement at present time but if pain changes or symptoms change or worsen would recommend reevaluation sooner. Advised patient we would likely repeat x-rays at that visit to ensure no significant progression and arthritic changes. Physical therapy for the lumbar spine and bilateral knees    I discussed the diagnosis of arthritis with the patient. We've discussed treatment options which would include anti-inflammatory medication, physical therapy, bracing, cortisone injections, and Visco supplementation. We have also discussed the benefits of low impact exercise and weight loss. We have also discussed the possibility of joint replacement surgery in the future. She will consider effects of injection at this point and consider intervention for partial vs total knee for latest in September October in preparation for the trip next year.             DEE Hernandes

## 2022-01-27 ENCOUNTER — OFFICE VISIT (OUTPATIENT)
Dept: ORTHOPEDIC SURGERY | Age: 57
End: 2022-01-27
Payer: COMMERCIAL

## 2022-01-27 VITALS — HEIGHT: 66 IN | WEIGHT: 215 LBS | BODY MASS INDEX: 34.55 KG/M2

## 2022-01-27 DIAGNOSIS — M25.562 CHRONIC PAIN OF LEFT KNEE: Primary | ICD-10-CM

## 2022-01-27 DIAGNOSIS — G89.29 CHRONIC BILATERAL LOW BACK PAIN WITHOUT SCIATICA: ICD-10-CM

## 2022-01-27 DIAGNOSIS — M25.551 BILATERAL HIP PAIN: ICD-10-CM

## 2022-01-27 DIAGNOSIS — M54.50 CHRONIC BILATERAL LOW BACK PAIN WITHOUT SCIATICA: ICD-10-CM

## 2022-01-27 DIAGNOSIS — G89.29 CHRONIC PAIN OF LEFT KNEE: Primary | ICD-10-CM

## 2022-01-27 DIAGNOSIS — M17.12 PRIMARY OSTEOARTHRITIS OF LEFT KNEE: ICD-10-CM

## 2022-01-27 DIAGNOSIS — M25.552 BILATERAL HIP PAIN: ICD-10-CM

## 2022-01-27 PROCEDURE — G8484 FLU IMMUNIZE NO ADMIN: HCPCS | Performed by: PHYSICIAN ASSISTANT

## 2022-01-27 PROCEDURE — G8427 DOCREV CUR MEDS BY ELIG CLIN: HCPCS | Performed by: PHYSICIAN ASSISTANT

## 2022-01-27 PROCEDURE — G8417 CALC BMI ABV UP PARAM F/U: HCPCS | Performed by: PHYSICIAN ASSISTANT

## 2022-01-27 PROCEDURE — 3017F COLORECTAL CA SCREEN DOC REV: CPT | Performed by: PHYSICIAN ASSISTANT

## 2022-01-27 PROCEDURE — 99214 OFFICE O/P EST MOD 30 MIN: CPT | Performed by: PHYSICIAN ASSISTANT

## 2022-01-27 PROCEDURE — 1036F TOBACCO NON-USER: CPT | Performed by: PHYSICIAN ASSISTANT

## 2022-01-27 NOTE — PROGRESS NOTES
Patient Name: Cely Lovelace  : 1965  DOS: 2021        Chief Complaint:   Chief Complaint   Patient presents with    Knee Pain     LEFT KNEE-     Currently: 2022  Patient is here for follow-up regarding left knee pain and osteoarthritis. She notes that her recent trip to [place] went well she did very well with utilization of the brace anti-inflammatories and resting but upon her return she hit her knee directly anterior medially on a low coffee table and has had increased irritation and pain across the anterior aspect of the knee describes it as a burning tingling pain that hits intermittently with walking only. She notes it comes very quickly and leaves very quickly depends on the step and the way she is stepping happens about every fourth or fifth step. She denies any buckling or give way still utilizing the brace denies any assistive walking devices at present time. Her concerns are for her upcoming trip in May to [place] and she is concerned that the recent injury may have worsened the arthritis that she is also developing aching pain at night rates it as a 3-4 out of 10 overall but can reach levels up to a 6 out of 10. She is wanting to ensure that moving forward with current plan is appropriate or if adjustments to the plan need to be made. Previously: 2021  Patient is here for follow-up regarding left knee pain and osteoarthritis. She notes that she recently picked up a part-time job that she has also recently left due to the increasing pain that she was experiencing while she was doing it. 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 She has since stopped doing this job and pain has started to settle back down. Rates pain levels as a 5 out of 10 states is almost on the medial aspect of the knee.   She is trying to figure out if she needs to move forward with the partial knee replacement as soon as possible or if she can wait till after her trip to [place]. She is very excited for her trip overseas coming spring 2022. She denies any fall trauma or injury she denies numbness burning tingling radiating pains down the leg. She still utilizes the brace to help with reduction irritation across the leg with long periods of standing and walking and still using intermittent anti-inflammatories and Tylenol. She notes that these help to control the pain fairly well. Previously: 9/16/2021  Patient is here for follow-up regarding left knee pain and osteoarthritis as well as evaluation regarding bilateral hip pain. Overall she notes limited irritation across the knee mostly states on the medial aspect of the knee especially after periods of walking and standing. She does note that utilization of the brace as well as medications do help and notes overall decent endurance. She did note a recent trip to [place] where she did a lot of walking on hills and had little issues the next day. She notes at this point time she is not sure if she should move forward with partial knee arthroplasty as she is planning a big trip to [place], May 2022. She notes that with her overall pain levels in the knee she is not convinced she should move forward with it but wants to be sure she is not running out of time in regards to her ability to do so. Denies any fall trauma or injury denies numbness burning tingling to baseline leg. In regards to bilateral hips she notes pain on bilateral lateral sides of the hips and noting difficulty with sleeping as well as laying on that side and difficulty with periods of walking and standing. He notes that this pain has been ongoing for 2 to 3 weeks and wants to be sure there is not osteoarthritis developing in the hips. She denies numbness burning tingling radiating pains down the leg. Denies any give way or buckling of the legs.   Is not utilizing any assistive walking devices at present time. She notes that she has not been diligent about doing her physical therapy exercises and is not sure if this has something to do with it. She notes she also has not been to the chiropractor in a while who usually does some intermittent work on her to help keep her back under control. She does note some mild irritation across the low back as well. Rates pain in hips as a 3-4 out of 10. Previously: 5/20/2021  Marked increase in pain in the left knee and irritation and limitation of walking and standing for long periods. Previously did well with injection for the knee. covid limited her activiteis and exercise which she feels may have increased her pain overall. Pending trip to [place] for which she is considering surgical intervention vs continued injections. Previously:      History of Present Illness:  Dom Dyer is a 64 y.o. female who presents with a chief complaint of continued complaints of pain in the knee with irritation with walking, stairs and with overuse. Pain in the knee regularly with swelling and discomfort. Currently: 10/8/2020  Patient notes her pain is no worse or less than her previous visit she notes that with the cortisone injection she had significant relief for several months. She notes that she has been trying to increase her exercise over the past few weeks and still notes a lot of irritability along the medial aspect of the knee especially with long periods of walking and standing. She denies buckling or give way denies any fall trauma or injury denies utilization of any assistive walking devices. Does utilize a brace on the knee when she is walking especially prolonged distances. But notes when she has to utilize a lot of stairs or sitting she does not utilize it as it is an uncomfortable and does not flex or bend well for the stairs.   She notes that she is utilizing the diclofenac intermittently for when she is noting more pain she will use it and then stop for a little while. She also notes with the increase in her overall activity including walking over the past few weeks she has noticed a decrease to the low back. And has also noticed some pain on the lateral aspect of the left hip. Denies numbness burning tingling radiating pains in the leg. Previously: 4/9/2020  Increase in pain over the past several years time. Pain worse in the past several moths. Increase in hobbling. Stiffness with intial walking. Right knee with some irritation. Injury of the right side going down the stairs 2018 with pain that has persisted with decrease in rom. Hip pain started several years ago with visit Dr Frank Urbina with no additional treatment other than supportive care and physical therpay for the bursitis and lateral hip pain. Mild changes of osteoarthritis in the right hip. Left foot with increased irritation medially. Basketball in the 80's with surgery and meniscectomy with partial arthroscopy. Medical History  Current Medications:   Prior to Admission medications    Medication Sig Start Date End Date Taking? Authorizing Provider   mupirocin (BACTROBAN) 2 % ointment Apply 2 Doses topically 3 times daily 4/2/21   Historical Provider, MD   olmesartan (BENICAR) 5 MG tablet Take 40 mg by mouth daily    Historical Provider, MD   diclofenac (VOLTAREN) 50 MG EC tablet Take 1 tablet by mouth 2 times daily (with meals) 4/9/20   Johnny Canales MD   acetaminophen (TYLENOL) 500 MG tablet Take 500 mg by mouth    Historical Provider, MD     Allergies: Allergies   Allergen Reactions    Amoxicillin Hives     New allergy        Review of Systems:     Review of Systems ______  Constitutional: Negative for fever and diaphoresis. ____________  Respiratory: Negative for shortness of breath.  ________  Gastrointestinal: Negative for abdominal pain. ________  Musculoskeletal: Positive for joint pain. ____  Skin: Negative for itching. ____  Neurological: Negative for loss of consciousness. ______________  All other systems reviewed and negative     No past medical history on file. No family history on file. Social History     Socioeconomic History    Marital status:      Spouse name: Not on file    Number of children: Not on file    Years of education: Not on file    Highest education level: Not on file   Occupational History    Not on file   Tobacco Use    Smoking status: Never Smoker    Smokeless tobacco: Never Used   Substance and Sexual Activity    Alcohol use: Not on file    Drug use: Not on file    Sexual activity: Not on file   Other Topics Concern    Not on file   Social History Narrative    Not on file     Social Determinants of Health     Financial Resource Strain:     Difficulty of Paying Living Expenses: Not on file   Food Insecurity:     Worried About Running Out of Food in the Last Year: Not on file    Jayshree of Food in the Last Year: Not on file   Transportation Needs:     Lack of Transportation (Medical): Not on file    Lack of Transportation (Non-Medical):  Not on file   Physical Activity:     Days of Exercise per Week: Not on file    Minutes of Exercise per Session: Not on file   Stress:     Feeling of Stress : Not on file   Social Connections:     Frequency of Communication with Friends and Family: Not on file    Frequency of Social Gatherings with Friends and Family: Not on file    Attends Oriental orthodox Services: Not on file    Active Member of Clubs or Organizations: Not on file    Attends Club or Organization Meetings: Not on file    Marital Status: Not on file   Intimate Partner Violence:     Fear of Current or Ex-Partner: Not on file    Emotionally Abused: Not on file    Physically Abused: Not on file    Sexually Abused: Not on file   Housing Stability:     Unable to Pay for Housing in the Last Year: Not on file    Number of Places Lived in the Last Year: Not on file    Unstable Housing in the Last Year: Not on file         Physical Exam __  Constitutional: She is oriented to person, place, and time and well-developed, well-nourished, and in no distress. No distress. ____  HENT:   Head: Normocephalic and atraumatic. ____  Eyes: Conjunctivae are normal. ________  Cardiovascular: Intact distal pulses. ____  Pulmonary/Chest: Effort normal. ________________________  Neurological: She is alert and oriented to person, place, and time. ____  Skin: Skin is dry. She is not diaphoretic. ____  Psychiatric: Mood, affect and judgment normal. ______          Assessment   Vital Signs: There were no vitals filed for this visit. left Knee shows evidence for DJD with varus obvious pseudolaxity, pain with weight bearing, antalgic gait and palpable osteophytes. Inspection: Moderate anterior swelling. Swelling is present with mild effusion. The posterior aspect of the knee appears to be full with tenderness. There is no erythema, rash, or ecchymosis. Range of Motion:  Right 0-120 left0-120     Pain with varus testing    There is deformity varus moderate left    Strength:  Hamstrings rated: 4/5. Quadriceps rated: 4/5    Palpation: There is moderate tenderness along the medial joint line. Tenderness along the medial tibial plateau increases previous visit. Special Tests: Patellar Compression test is positive. Valgus & Varus test is positive. Skin: There are no rashes, ulcerations or lesions. Gait: Gait pattern is antalgic  Skin shows no rashes/ecchymosis to the affected area, no hyperesthesias, no discoloration, no temperature or color discrepancies. NEUROLOGICALLY: There is no evidence for sensory or motor deficits in the extremity. Coordination appears full with no spacticity or rigidity. Reflexes appear to be symmetric. Distal circulation intact. No signs of RSD.        Additional Comments:  Left foot with noted area of posterior tibial insuffienciency and valgus heel. Shortened left leg with ankle and knee issues. Right hip with lateral trochanteric pain. fulll rom of the right hip with no obvious issues with osteoarthritis dficencincy  Reduced lumbar rotation and flexion tenderness to paraspinal and left sacroiliac regions no radicular symptoms re-created or reproduced. Procedure(s): No new procedure performed at today's visit      Diagnostic Test Findings:   Xray   Have reviewed the xrays above from 1/27/2022  4 view x-ray of the left knee AP, lateral sunrise and tunnel views were performed and reviewed today and my impression is: left knee with obvious changes medially with osteophytes dramatic joint space narrowing complete joint space loss on the medial aspect of the knee mild change in the angular deformity noted varus angulation. Mild patellofemroal changes bilaterally. Complete joint space loss in the medial aspect of the left knee    AP pelvis single view performed on 9/16/2021 and reviewed today     Some obvious early osteoarthritis of the right hip on pelvis stable with no additional lesions . No obvious osteoarthritis of the left hip. Assessment and Plan:       Diagnosis Orders   1. Chronic pain of left knee     2. Primary osteoarthritis of left knee  XR KNEE LEFT (MIN 4 VIEWS)   3. Bilateral hip pain     4.  Chronic bilateral low back pain without sciatica                The orders below, if any, were placed during this visit:   Orders Placed This Encounter   Procedures    XR KNEE LEFT (MIN 4 VIEWS)     Order Specific Question:   Reason for exam:     Answer:   pain              Treatment Plan:   -Recommended utilization of crutches for 2 to 4 weeks time recommended use of 2 crutches for 2 weeks then 1 crutch for 2 weeks as tolerated to help with reduction weight and pressure across the left knee  -Advised for continued utilization of diclofenac 50 mg twice daily as needed for pain  -Advised for continued utilization of medial unloading brace across the left knee. -Did discuss with patient we could pursue prednisone 10 mg for 10 days to help with reduction and irritation as well if she so chooses. -We will plan to continue to manage with conservative treatment until her return from [employer] in May  -Did discuss potential pursuance of intra-articular injection with corticosteroid or PRP and possible genicular nerve block pending patient's needs. Plan for supplements, topical and oral anti-inflammatory medications. Shoe inserts for the imbalance of the posterior tibial tendons. Shoe lift for the lld should help the right hip. Advised for continued utilization of bracing for long periods of standing and walking. Advised patient to follow-up in 3 weeks for repeat evaluation and possible repeat injections or further discussion regarding potential partial knee replacement with patient's overall function and pain level would not recommend pursuing replacement at present time but if pain changes or symptoms change or worsen would recommend reevaluation sooner. Physical therapy for the lumbar spine and bilateral knees    I discussed the diagnosis of arthritis with the patient. We've discussed treatment options which would include anti-inflammatory medication, physical therapy, bracing, cortisone injections, and Visco supplementation. We have also discussed the benefits of low impact exercise and weight loss. We have also discussed the possibility of joint replacement surgery in the future. She will consider effects of injection at this point and consider intervention for partial vs total knee for latest in September October in preparation for the trip next year.             DEE Gonsalves

## 2022-02-01 ENCOUNTER — TELEPHONE (OUTPATIENT)
Dept: ORTHOPEDIC SURGERY | Age: 57
End: 2022-02-01

## 2022-02-02 ENCOUNTER — TELEPHONE (OUTPATIENT)
Dept: ORTHOPEDIC SURGERY | Age: 57
End: 2022-02-02

## 2022-02-02 DIAGNOSIS — G89.29 CHRONIC PAIN OF LEFT KNEE: Primary | ICD-10-CM

## 2022-02-02 DIAGNOSIS — M25.562 CHRONIC PAIN OF LEFT KNEE: Primary | ICD-10-CM

## 2022-02-02 NOTE — TELEPHONE ENCOUNTER
I spoke with the patient and let her know that per Goyo Jo we can order the MRI. I will call her back once I hear back from the pre-cert department.

## 2022-02-17 ENCOUNTER — OFFICE VISIT (OUTPATIENT)
Dept: ORTHOPEDIC SURGERY | Age: 57
End: 2022-02-17
Payer: COMMERCIAL

## 2022-02-17 VITALS — HEIGHT: 66 IN | WEIGHT: 215 LBS | BODY MASS INDEX: 34.55 KG/M2

## 2022-02-17 DIAGNOSIS — M25.562 CHRONIC PAIN OF LEFT KNEE: Primary | ICD-10-CM

## 2022-02-17 DIAGNOSIS — G89.29 CHRONIC PAIN OF LEFT KNEE: Primary | ICD-10-CM

## 2022-02-17 PROCEDURE — 99214 OFFICE O/P EST MOD 30 MIN: CPT | Performed by: PHYSICIAN ASSISTANT

## 2022-02-17 PROCEDURE — G8484 FLU IMMUNIZE NO ADMIN: HCPCS | Performed by: PHYSICIAN ASSISTANT

## 2022-02-17 PROCEDURE — G8417 CALC BMI ABV UP PARAM F/U: HCPCS | Performed by: PHYSICIAN ASSISTANT

## 2022-02-17 PROCEDURE — 3017F COLORECTAL CA SCREEN DOC REV: CPT | Performed by: PHYSICIAN ASSISTANT

## 2022-02-17 PROCEDURE — L1812 KO ELASTIC W/JOINTS PRE OTS: HCPCS | Performed by: PHYSICIAN ASSISTANT

## 2022-02-17 PROCEDURE — 1036F TOBACCO NON-USER: CPT | Performed by: PHYSICIAN ASSISTANT

## 2022-02-17 PROCEDURE — G8428 CUR MEDS NOT DOCUMENT: HCPCS | Performed by: PHYSICIAN ASSISTANT

## 2022-02-17 NOTE — PROGRESS NOTES
Patient Name: Gina Reno  : 1965  DOS: 2021        Chief Complaint:   Chief Complaint   Patient presents with    Knee Pain     F/U Left knee, 2/10 pain, MRI TR     Currently: 2022  Patient is here for follow-up regarding left knee pain and osteoarthritis and to discuss results of the MRI that was recently performed. She notes at today's visit she is doing very well with pain levels as a 2 out of 10 she notes she had been staying off of it and had maintained her off work position in regards to her work at [employer] in she notes that this is helped significantly as well as the addition of icing and utilization of the brace on a continued basis. She notes that with periods of full overactivity the knee does flareup and get a little aggravated but with rest ice elevation it does calm back down. Denies any fall trauma or injury denies numbness burning tingling radiating pains down the leg. She is hopeful that the MRI will provide insight as to whether or not partial knee is still a potential avenue of pursuit in the near future. She is still planning on pursuing her trip to [place] in May. Previously: 2022  Patient is here for follow-up regarding left knee pain and osteoarthritis. She notes that her recent trip to [place] went well she did very well with utilization of the brace anti-inflammatories and resting but upon her return she hit her knee directly anterior medially on a low coffee table and has had increased irritation and pain across the anterior aspect of the knee describes it as a burning tingling pain that hits intermittently with walking only. She notes it comes very quickly and leaves very quickly depends on the step and the way she is stepping happens about every fourth or fifth step. She denies any buckling or give way still utilizing the brace denies any assistive walking devices at present time.  Her concerns are for her upcoming trip in May to [place] and she is concerned that the recent injury may have worsened the arthritis that she is also developing aching pain at night rates it as a 3-4 out of 10 overall but can reach levels up to a 6 out of 10. She is wanting to ensure that moving forward with current plan is appropriate or if adjustments to the plan need to be made. Previously: 12/16/2021  Patient is here for follow-up regarding left knee pain and osteoarthritis. She notes that she recently picked up a part-time job that she has also recently left due to the increasing pain that she was experiencing while she was doing it. 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 She has since stopped doing this job and pain has started to settle back down. Rates pain levels as a 5 out of 10 states is almost on the medial aspect of the knee. She is trying to figure out if she needs to move forward with the partial knee replacement as soon as possible or if she can wait till after her trip to [place]. She is very excited for her trip overseas coming spring 2022. She denies any fall trauma or injury she denies numbness burning tingling radiating pains down the leg. She still utilizes the brace to help with reduction irritation across the leg with long periods of standing and walking and still using intermittent anti-inflammatories and Tylenol. She notes that these help to control the pain fairly well. Previously: 9/16/2021  Patient is here for follow-up regarding left knee pain and osteoarthritis as well as evaluation regarding bilateral hip pain. Overall she notes limited irritation across the knee mostly states on the medial aspect of the knee especially after periods of walking and standing. She does note that utilization of the brace as well as medications do help and notes overall decent endurance.   She did note a recent trip to [place] where she did a lot of walking on hills and had little issues the next day. She notes at this point time she is not sure if she should move forward with partial knee arthroplasty as she is planning a big trip to [place], May 2022. She notes that with her overall pain levels in the knee she is not convinced she should move forward with it but wants to be sure she is not running out of time in regards to her ability to do so. Denies any fall trauma or injury denies numbness burning tingling to baseline leg. In regards to bilateral hips she notes pain on bilateral lateral sides of the hips and noting difficulty with sleeping as well as laying on that side and difficulty with periods of walking and standing. He notes that this pain has been ongoing for 2 to 3 weeks and wants to be sure there is not osteoarthritis developing in the hips. She denies numbness burning tingling radiating pains down the leg. Denies any give way or buckling of the legs. Is not utilizing any assistive walking devices at present time. She notes that she has not been diligent about doing her physical therapy exercises and is not sure if this has something to do with it. She notes she also has not been to the chiropractor in a while who usually does some intermittent work on her to help keep her back under control. She does note some mild irritation across the low back as well. Rates pain in hips as a 3-4 out of 10. Previously: 5/20/2021  Marked increase in pain in the left knee and irritation and limitation of walking and standing for long periods. Previously did well with injection for the knee. covid limited her activiteis and exercise which she feels may have increased her pain overall. Pending trip to [place] for which she is considering surgical intervention vs continued injections.            Previously:      History of Present Illness:  Bucky Steve is a 64 y.o. female who presents with a chief complaint of continued complaints of pain in the knee with irritation with walking, stairs and with overuse. Pain in the knee regularly with swelling and discomfort. Currently: 10/8/2020  Patient notes her pain is no worse or less than her previous visit she notes that with the cortisone injection she had significant relief for several months. She notes that she has been trying to increase her exercise over the past few weeks and still notes a lot of irritability along the medial aspect of the knee especially with long periods of walking and standing. She denies buckling or give way denies any fall trauma or injury denies utilization of any assistive walking devices. Does utilize a brace on the knee when she is walking especially prolonged distances. But notes when she has to utilize a lot of stairs or sitting she does not utilize it as it is an uncomfortable and does not flex or bend well for the stairs. She notes that she is utilizing the diclofenac intermittently for when she is noting more pain she will use it and then stop for a little while. She also notes with the increase in her overall activity including walking over the past few weeks she has noticed a decrease to the low back. And has also noticed some pain on the lateral aspect of the left hip. Denies numbness burning tingling radiating pains in the leg. Previously: 4/9/2020  Increase in pain over the past several years time. Pain worse in the past several moths. Increase in hobbling. Stiffness with intial walking. Right knee with some irritation. Injury of the right side going down the stairs 2018 with pain that has persisted with decrease in rom. Hip pain started several years ago with visit Dr Susana York with no additional treatment other than supportive care and physical therpay for the bursitis and lateral hip pain. Mild changes of osteoarthritis in the right hip. Left foot with increased irritation medially. Basketball in the 80's with surgery and meniscectomy with partial arthroscopy. Medical History  Current Medications:   Prior to Admission medications    Medication Sig Start Date End Date Taking? Authorizing Provider   diclofenac (VOLTAREN) 50 MG EC tablet Take 1 tablet by mouth 2 times daily (with meals) 2/17/22  Yes Lettie Najjar, PA   mupirocin (BACTROBAN) 2 % ointment Apply 2 Doses topically 3 times daily 4/2/21   Historical Provider, MD   olmesartan (BENICAR) 5 MG tablet Take 40 mg by mouth daily    Historical Provider, MD   acetaminophen (TYLENOL) 500 MG tablet Take 500 mg by mouth    Historical Provider, MD     Allergies: Allergies   Allergen Reactions    Amoxicillin Hives     New allergy        Review of Systems:     Review of Systems ______  Constitutional: Negative for fever and diaphoresis. ____________  Respiratory: Negative for shortness of breath.  ________  Gastrointestinal: Negative for abdominal pain. ________  Musculoskeletal: Positive for joint pain. ____  Skin: Negative for itching. ____  Neurological: Negative for loss of consciousness. ______________  All other systems reviewed and negative     No past medical history on file. No family history on file.   Social History     Socioeconomic History    Marital status:      Spouse name: Not on file    Number of children: Not on file    Years of education: Not on file    Highest education level: Not on file   Occupational History    Not on file   Tobacco Use    Smoking status: Never Smoker    Smokeless tobacco: Never Used   Substance and Sexual Activity    Alcohol use: Not on file    Drug use: Not on file    Sexual activity: Not on file   Other Topics Concern    Not on file   Social History Narrative    Not on file     Social Determinants of Health     Financial Resource Strain:     Difficulty of Paying Living Expenses: Not on file   Food Insecurity:     Worried About [address] in the Last Year: Not on file    Ran Out of Food in the Last Year: Not on file   Transportation Needs:     Lack of Transportation (Medical): Not on file    Lack of Transportation (Non-Medical): Not on file   Physical Activity:     Days of Exercise per Week: Not on file    Minutes of Exercise per Session: Not on file   Stress:     Feeling of Stress : Not on file   Social Connections:     Frequency of Communication with Friends and Family: Not on file    Frequency of Social Gatherings with Friends and Family: Not on file    Attends Roman [employer] Services: Not on file    Active Member of [address] [place] [address] [employer] or Organizations: Not on file    Attends Club or Organization Meetings: Not on file    Marital Status: Not on file   Intimate Partner Violence:     Fear of Current or Ex-Partner: Not on file    Emotionally Abused: Not on file    Physically Abused: Not on file    Sexually Abused: Not on file   Housing Stability:     Unable to Pay for Housing in the Last Year: Not on file    Number of [place] in the Last Year: Not on file    Unstable Housing in the Last Year: Not on file         Physical Exam __  Constitutional: She is oriented to person, place, and time and well-developed, well-nourished, and in no distress. No distress. ____  HENT:   Head: Normocephalic and atraumatic. ____  Eyes: Conjunctivae are normal. ________  Cardiovascular: Intact distal pulses. ____  Pulmonary/Chest: Effort normal. ________________________  Neurological: She is alert and oriented to person, place, and time. ____  Skin: Skin is dry. She is not diaphoretic. ____  Psychiatric: Mood, affect and judgment normal. ______          Assessment   Vital Signs: There were no vitals filed for this visit. left Knee shows evidence for DJD with varus obvious pseudolaxity, pain with weight bearing, antalgic gait and palpable osteophytes. Inspection: Moderate anterior swelling. Swelling is present with mild effusion.  The posterior aspect of the knee appears to be full with tenderness. There is no erythema, rash, or ecchymosis. Range of Motion:  Right 0-120 left0-120     Pain with varus testing    There is deformity varus moderate left    Strength:  Hamstrings rated: 4/5. Quadriceps rated: 4/5    Palpation: There is mild tenderness along the medial joint line. Tenderness along the medial tibial plateau increases previous visit. Special Tests: Patellar Compression test is positive. Valgus & Varus test is positive. Skin: There are no rashes, ulcerations or lesions. Gait: Gait pattern is antalgic  Skin shows no rashes/ecchymosis to the affected area, no hyperesthesias, no discoloration, no temperature or color discrepancies. NEUROLOGICALLY: There is no evidence for sensory or motor deficits in the extremity. Coordination appears full with no spacticity or rigidity. Reflexes appear to be symmetric. Distal circulation intact. No signs of RSD. Additional Comments:  Left foot with noted area of posterior tibial insuffienciency and valgus heel. Shortened left leg with ankle and knee issues. Right hip with lateral trochanteric pain. fulll rom of the right hip with no obvious issues with osteoarthritis dficencincy  Reduced lumbar rotation and flexion tenderness to paraspinal and left sacroiliac regions no radicular symptoms re-created or reproduced. Procedure(s): No new procedure performed at today's visit      Diagnostic Test Findings:   Xray   Have reviewed the xrays above from 1/27/2022  4 view x-ray of the left knee AP, lateral sunrise and tunnel views were performed and reviewed today and my impression is: left knee with obvious changes medially with osteophytes dramatic joint space narrowing complete joint space loss on the medial aspect of the knee mild change in the angular deformity noted varus angulation. Mild patellofemroal changes bilaterally.   Complete joint space loss in the medial aspect of the left knee    AP pelvis single view performed on 9/16/2021 and reviewed today     Some obvious early osteoarthritis of the right hip on pelvis stable with no additional lesions . No obvious osteoarthritis of the left hip. Reviewed MRI performed on 2/7/2022 revealing KL grade 4 osteoarthritic changes on the medial aspect of the knee with chronic full-thickness ACL tear due to impingement at the notch with mild passive anterior tibial translation. Post meniscectomy changes of the medial meniscus meniscus no acute tear of medial or lateral meniscus grade 3 changes on the lateral and patellofemoral compartments insufficiency fractures noted to tibial plateau and femoral condyle on the medial aspect of the knee    Assessment and Plan:       Diagnosis Orders   1. Chronic pain of left knee  [employer] Hinged Knee Brace              The orders below, if any, were placed during this visit:   Orders Placed This Encounter   Procedures    [employer] Hinged Knee Brace     Patient was prescribed a [employer] Hinged Knee Brace. The left knee will require stabilization / immobilization from this semi-rigid / rigid orthosis to improve their function. The orthosis will assist in protecting the affected area, provide functional support and facilitate healing. The patient was educated and fit by a healthcare professional with expert knowledge and specialization in brace application while under the direct supervision of the treating physician. Verbal and written instructions for the use of and application of this item were provided. They were instructed to contact the office immediately should the brace result in increased pain, decreased sensation, increased swelling or worsening of the condition.               Treatment Plan:   -Recommended intermittent utilization of crutches or cane on an as-needed basis  -Advised for continued utilization of diclofenac 50 mg twice daily as needed for pain refill prescription for this  -Advised for continued

## 2022-02-17 NOTE — LETTER
VIRGIE Ksenia Beckett  [address] [facility] [address]  Phone: [phone]  Fax: [phone]    Sandra Pardo MD        February 17, 2022     Patient: Vesna Virk   YOB: 1965   Date of Visit: 2/17/2022       To Whom It May Concern: It is my medical opinion that Martin Padilla should remain out of work until 03/31/22. If you have any questions or concerns, please don't hesitate to call.     Sincerely,      Sandra Pardo MD

## 2022-02-28 ENCOUNTER — HOSPITAL ENCOUNTER (OUTPATIENT)
Dept: MAMMOGRAPHY | Age: 57
Discharge: HOME | End: 2022-02-28
Payer: COMMERCIAL

## 2022-02-28 VITALS — WEIGHT: 230 LBS | HEIGHT: 66 IN | BODY MASS INDEX: 36.96 KG/M2

## 2022-02-28 DIAGNOSIS — Z12.31 VISIT FOR SCREENING MAMMOGRAM: ICD-10-CM

## 2022-02-28 PROCEDURE — 77063 BREAST TOMOSYNTHESIS BI: CPT

## 2022-04-21 ENCOUNTER — OFFICE VISIT (OUTPATIENT)
Dept: ORTHOPEDIC SURGERY | Age: 57
End: 2022-04-21
Payer: COMMERCIAL

## 2022-04-21 VITALS — HEIGHT: 65 IN | BODY MASS INDEX: 38.32 KG/M2 | WEIGHT: 230 LBS

## 2022-04-21 DIAGNOSIS — M25.562 CHRONIC PAIN OF LEFT KNEE: Primary | ICD-10-CM

## 2022-04-21 DIAGNOSIS — G89.29 CHRONIC PAIN OF LEFT KNEE: Primary | ICD-10-CM

## 2022-04-21 DIAGNOSIS — M17.12 PRIMARY OSTEOARTHRITIS OF LEFT KNEE: ICD-10-CM

## 2022-04-21 DIAGNOSIS — M25.561 CHRONIC PAIN OF RIGHT KNEE: ICD-10-CM

## 2022-04-21 DIAGNOSIS — G89.29 CHRONIC PAIN OF RIGHT KNEE: ICD-10-CM

## 2022-04-21 PROCEDURE — 20610 DRAIN/INJ JOINT/BURSA W/O US: CPT | Performed by: PHYSICIAN ASSISTANT

## 2022-04-21 PROCEDURE — 3017F COLORECTAL CA SCREEN DOC REV: CPT | Performed by: PHYSICIAN ASSISTANT

## 2022-04-21 PROCEDURE — G8427 DOCREV CUR MEDS BY ELIG CLIN: HCPCS | Performed by: PHYSICIAN ASSISTANT

## 2022-04-21 PROCEDURE — 1036F TOBACCO NON-USER: CPT | Performed by: PHYSICIAN ASSISTANT

## 2022-04-21 PROCEDURE — L1812 KO ELASTIC W/JOINTS PRE OTS: HCPCS | Performed by: PHYSICIAN ASSISTANT

## 2022-04-21 PROCEDURE — G8417 CALC BMI ABV UP PARAM F/U: HCPCS | Performed by: PHYSICIAN ASSISTANT

## 2022-04-21 PROCEDURE — 99214 OFFICE O/P EST MOD 30 MIN: CPT | Performed by: PHYSICIAN ASSISTANT

## 2022-04-21 RX ORDER — ROPIVACAINE HYDROCHLORIDE 5 MG/ML
30 INJECTION, SOLUTION EPIDURAL; INFILTRATION; PERINEURAL ONCE
Status: COMPLETED | OUTPATIENT
Start: 2022-04-21 | End: 2022-04-21

## 2022-04-21 RX ORDER — BUPIVACAINE HYDROCHLORIDE 5 MG/ML
1 INJECTION, SOLUTION PERINEURAL ONCE
Status: COMPLETED | OUTPATIENT
Start: 2022-04-21 | End: 2022-04-21

## 2022-04-21 RX ORDER — TRIAMCINOLONE ACETONIDE 40 MG/ML
40 INJECTION, SUSPENSION INTRA-ARTICULAR; INTRAMUSCULAR ONCE
Status: COMPLETED | OUTPATIENT
Start: 2022-04-21 | End: 2022-04-21

## 2022-04-21 RX ORDER — PREDNISONE 10 MG/1
10 TABLET ORAL DAILY
Qty: 10 TABLET | Refills: 0 | Status: SHIPPED | OUTPATIENT
Start: 2022-04-21 | End: 2022-05-01

## 2022-04-21 RX ADMIN — BUPIVACAINE HYDROCHLORIDE 5 MG: 5 INJECTION, SOLUTION PERINEURAL at 10:09

## 2022-04-21 RX ADMIN — ROPIVACAINE HYDROCHLORIDE 30 ML: 5 INJECTION, SOLUTION EPIDURAL; INFILTRATION; PERINEURAL at 10:10

## 2022-04-21 RX ADMIN — TRIAMCINOLONE ACETONIDE 40 MG: 40 INJECTION, SUSPENSION INTRA-ARTICULAR; INTRAMUSCULAR at 10:10

## 2022-04-21 NOTE — PROGRESS NOTES
Administrations This Visit     bupivacaine (MARCAINE) 0.5 % injection 5 mg     Admin Date  04/21/2022  10:09 Action  Given Dose  5 mg Route  Intra-artICUlar Site  Knee Left Administered By  Angelito Balloon    Ordering Provider: DEE Khoury    Naveen [address]ara [address]: [id number]    Lot#: [id number]    : [address] [facility]    Patient Supplied?: No          ropivacaine (NAROPIN) 0.5% injection 30 mL     Admin Date  04/21/2022  10:10 Action  Given Dose  30 mL Route  Other Site  Knee Left Administered By  Angelito Balloon    Ordering Provider: DEE Khoury    ND: [id number]    Lot#: [id number]    : [address] [facility]    Patient Supplied?: No          triamcinolone acetonide (KENALOG-40) injection 40 mg     Admin Date  04/21/2022  10:10 Action  Given Dose  40 mg Route  Intra-artICUlar Site  Knee Left Administered By  Angelito Balloon    Ordering Provider: DEE Khoury    NDC: 7422-2097-10    Lot#: [id number]    : [employer] (PRIMARY C[facility])    Patient Supplied?: No

## 2022-04-21 NOTE — PROGRESS NOTES
Patient Name: Ziggy Ramirez  : 1965  DOS: 2021        Chief Complaint:   Chief Complaint   Patient presents with    Knee Pain     LEFT KNEE     Currently: 2022  Patient is here for follow-up regarding left knee pain and osteoarthritis and to hopefully pursue cortisone injection prior to her upcoming trip in 2 weeks time. She notes she is still planning to pursue a trip to [place]. She is doing well with utilization of the brace and is hoping to get a brace for the right knee as it is giving her a little bit of trouble at today's visit. She denies any fall trauma or injury. Denies numbness burning tingling radiating pains down the leg. Rates overall pain level is a 3 out of 10. Previously: 2022  Patient is here for follow-up regarding left knee pain and osteoarthritis and to discuss results of the MRI that was recently performed. She notes at today's visit she is doing very well with pain levels as a 2 out of 10 she notes she had been staying off of it and had maintained her off work position in regards to her work at [employer] in she notes that this is helped significantly as well as the addition of icing and utilization of the brace on a continued basis. She notes that with periods of full overactivity the knee does flareup and get a little aggravated but with rest ice elevation it does calm back down. Denies any fall trauma or injury denies numbness burning tingling radiating pains down the leg. She is hopeful that the MRI will provide insight as to whether or not partial knee is still a potential avenue of pursuit in the near future. She is still planning on pursuing her trip to [place] in May. Previously: 2022  Patient is here for follow-up regarding left knee pain and osteoarthritis.  She notes that her recent trip to [place] went well she did very well with utilization of the brace anti-inflammatories and resting but upon her return she hit her knee directly anterior medially on a low coffee table and has had increased irritation and pain across the anterior aspect of the knee describes it as a burning tingling pain that hits intermittently with walking only. She notes it comes very quickly and leaves very quickly depends on the step and the way she is stepping happens about every fourth or fifth step. She denies any buckling or give way still utilizing the brace denies any assistive walking devices at present time. Her concerns are for her upcoming trip in May to [address] [place] and she is concerned that the recent injury may have worsened the arthritis that she is also developing aching pain at night rates it as a 3-4 out of 10 overall but can reach levels up to a 6 out of 10. She is wanting to ensure that moving forward with current plan is appropriate or if adjustments to the plan need to be made. Previously: 12/16/2021  Patient is here for follow-up regarding left knee pain and osteoarthritis. She notes that she recently picked up a part-time job that she has also recently left due to the increasing pain that she was experiencing while she was doing it. 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 She has since stopped doing this job and pain has started to settle back down. Rates pain levels as a 5 out of 10 states is almost on the medial aspect of the knee. She is trying to figure out if she needs to move forward with the partial knee replacement as soon as possible or if she can wait till after her trip to [place] [place]. She is very excited for her trip overseas coming spring 2022. She denies any fall trauma or injury she denies numbness burning tingling radiating pains down the leg.   She still utilizes the brace to help with reduction irritation across the leg with long periods of standing and walking and still using intermittent anti-inflammatories and Tylenol. She notes that these help to control the pain fairly well. Previously: 9/16/2021  Patient is here for follow-up regarding left knee pain and osteoarthritis as well as evaluation regarding bilateral hip pain. Overall she notes limited irritation across the knee mostly states on the medial aspect of the knee especially after periods of walking and standing. She does note that utilization of the brace as well as medications do help and notes overall decent endurance. She did note a recent trip to [place] where she did a lot of walking on hills and had little issues the next day. She notes at this point time she is not sure if she should move forward with partial knee arthroplasty as she is planning a big trip to [place], May 2022. She notes that with her overall pain levels in the knee she is not convinced she should move forward with it but wants to be sure she is not running out of time in regards to her ability to do so. Denies any fall trauma or injury denies numbness burning tingling to baseline leg. In regards to bilateral hips she notes pain on bilateral lateral sides of the hips and noting difficulty with sleeping as well as laying on that side and difficulty with periods of walking and standing. He notes that this pain has been ongoing for 2 to 3 weeks and wants to be sure there is not osteoarthritis developing in the hips. She denies numbness burning tingling radiating pains down the leg. Denies any give way or buckling of the legs. Is not utilizing any assistive walking devices at present time. She notes that she has not been diligent about doing her physical therapy exercises and is not sure if this has something to do with it. She notes she also has not been to the chiropractor in a while who usually does some intermittent work on her to help keep her back under control. She does note some mild irritation across the low back as well.   Rates pain in hips as a 3-4 out of 10. Previously: 5/20/2021  Marked increase in pain in the left knee and irritation and limitation of walking and standing for long periods. Previously did well with injection for the knee. covid limited her activiteis and exercise which she feels may have increased her pain overall. Pending trip to [place] for which she is considering surgical intervention vs continued injections. Previously:      History of Present Illness:  Omega Aguirre is a 64 y.o. female who presents with a chief complaint of continued complaints of pain in the knee with irritation with walking, stairs and with overuse. Pain in the knee regularly with swelling and discomfort. Currently: 10/8/2020  Patient notes her pain is no worse or less than her previous visit she notes that with the cortisone injection she had significant relief for several months. She notes that she has been trying to increase her exercise over the past few weeks and still notes a lot of irritability along the medial aspect of the knee especially with long periods of walking and standing. She denies buckling or give way denies any fall trauma or injury denies utilization of any assistive walking devices. Does utilize a brace on the knee when she is walking especially prolonged distances. But notes when she has to utilize a lot of stairs or sitting she does not utilize it as it is an uncomfortable and does not flex or bend well for the stairs. She notes that she is utilizing the diclofenac intermittently for when she is noting more pain she will use it and then stop for a little while. She also notes with the increase in her overall activity including walking over the past few weeks she has noticed a decrease to the low back. And has also noticed some pain on the lateral aspect of the left hip. Denies numbness burning tingling radiating pains in the leg.     Previously: 4/9/2020  Increase in pain over the past several years time. Pain worse in the past several moths. Increase in hobbling. Stiffness with intial walking. Right knee with some irritation. Injury of the right side going down the stairs 2018 with pain that has persisted with decrease in rom. Hip pain started several years ago with visit Dr Quique Leung with no additional treatment other than supportive care and physical therpay for the bursitis and lateral hip pain. Mild changes of osteoarthritis in the right hip. Left foot with increased irritation medially. Basketball in the 80's with surgery and meniscectomy with partial arthroscopy. Medical History  Current Medications:   Prior to Admission medications    Medication Sig Start Date End Date Taking? Authorizing Provider   diclofenac (VOLTAREN) 50 MG EC tablet Take 1 tablet by mouth 2 times daily (with meals) 2/17/22   DEE Carrillo   mupirocin (BACTROBAN) 2 % ointment Apply 2 Doses topically 3 times daily 4/2/21   Historical Provider, MD   olmesartan (BENICAR) 5 MG tablet Take 40 mg by mouth daily    Historical Provider, MD   acetaminophen (TYLENOL) 500 MG tablet Take 500 mg by mouth    Historical Provider, MD     Allergies: Allergies   Allergen Reactions    Amoxicillin Hives     New allergy        Review of Systems:     Review of Systems ______  Constitutional: Negative for fever and diaphoresis. ____________  Respiratory: Negative for shortness of breath.  ________  Gastrointestinal: Negative for abdominal pain. ________  Musculoskeletal: Positive for joint pain. ____  Skin: Negative for itching. ____  Neurological: Negative for loss of consciousness. ______________  All other systems reviewed and negative     No past medical history on file.   Family History   Problem Relation Age of Onset    Breast Cancer Mother     Breast Cancer Maternal Grandmother      Social History     Socioeconomic History    Marital status:      Spouse name: Not on file    Number of children: Not on file    Years of education: Not on file    Highest education level: Not on file   Occupational History    Not on file   Tobacco Use    Smoking status: Never Smoker    Smokeless tobacco: Never Used   Substance and Sexual Activity    Alcohol use: Not on file    Drug use: Not on file    Sexual activity: Not on file   Other Topics Concern    Not on file   Social History Narrative    Not on file     Social Determinants of Health     Financial Resource Strain:     Difficulty of Paying Living Expenses: Not on file   Food Insecurity:     Worried About Running Out of Food in the Last Year: Not on file    Jayshree of Food in the Last Year: Not on file   Transportation Needs:     Lack of Transportation (Medical): Not on file    Lack of Transportation (Non-Medical): Not on file   Physical Activity:     Days of Exercise per Week: Not on file    Minutes of Exercise per Session: Not on file   Stress:     Feeling of Stress : Not on file   Social Connections:     Frequency of Communication with Friends and Family: Not on file    Frequency of Social Gatherings with Friends and Family: Not on file    Attends Mosque Services: Not on file    Active Member of [address] [place] [address] or Organizations: Not on file    Attends Club or Organization Meetings: Not on file    Marital Status: Not on file   Intimate Partner Violence:     Fear of Current or Ex-Partner: Not on file    Emotionally Abused: Not on file    Physically Abused: Not on file    Sexually Abused: Not on file   Housing Stability:     Unable to Pay for Housing in the Last Year: Not on file    Number of [place] in the Last Year: Not on file    Unstable Housing in the Last Year: Not on file         Physical Exam __  Constitutional: She is oriented to person, place, and time and well-developed, well-nourished, and in no distress. No distress.  ____  HENT:   Head: Normocephalic and atraumatic. ____  Eyes: Conjunctivae are normal. ________  Cardiovascular: Intact distal pulses. ____  Pulmonary/Chest: Effort normal. ________________________  Neurological: She is alert and oriented to person, place, and time. ____  Skin: Skin is dry. She is not diaphoretic. ____  Psychiatric: Mood, affect and judgment normal. ______          Assessment   Vital Signs: There were no vitals filed for this visit. left Knee shows evidence for DJD with varus obvious pseudolaxity, pain with weight bearing, antalgic gait and palpable osteophytes. Inspection: Moderate anterior swelling. Swelling is present with mild effusion. The posterior aspect of the knee appears to be full with tenderness. There is no erythema, rash, or ecchymosis. Range of Motion:  Right 0-120 left0-120     Pain with varus testing    There is deformity varus moderate left    Strength:  Hamstrings rated: 4/5. Quadriceps rated: 4/5    Palpation: There is mild tenderness along the medial joint line. Tenderness along the medial tibial plateau increases previous visit. Special Tests: Patellar Compression test is positive. Valgus & Varus test is positive. Skin: There are no rashes, ulcerations or lesions. Gait: Gait pattern is antalgic  Skin shows no rashes/ecchymosis to the affected area, no hyperesthesias, no discoloration, no temperature or color discrepancies. NEUROLOGICALLY: There is no evidence for sensory or motor deficits in the extremity. Coordination appears full with no spacticity or rigidity. Reflexes appear to be symmetric. Distal circulation intact. No signs of RSD. Additional Comments:  Left foot with noted area of posterior tibial insuffienciency and valgus heel. Shortened left leg with ankle and knee issues. Right hip with lateral trochanteric pain.    fulll rom of the right hip with no obvious issues with osteoarthritis dficencincy  Reduced lumbar rotation and flexion tenderness to paraspinal and left sacroiliac regions no radicular symptoms re-created or reproduced. Procedure(s):   Injection Procedure Note  Procedure Details     Verbal consent was obtained for the procedure. The left knee(s) was prepped with iodine and the skin was anesthetized. Using a 22 gauge needle the left knee(s) joint is injected with 2 ml 1% lidocaine and 2 ml of triamcinolone (KENALOG) 40mg/ml under the lateral aspect of the knee. The injection site was cleansed with topical isopropyl alcohol and a dressing was applied. Complications:  None; patient tolerated the procedure well. Diagnostic Test Findings:   Xray   Have reviewed the xrays above from 1/27/2022  4 view x-ray of the left knee AP, lateral sunrise and tunnel views were performed and reviewed today and my impression is: left knee with obvious changes medially with osteophytes dramatic joint space narrowing complete joint space loss on the medial aspect of the knee mild change in the angular deformity noted varus angulation. Mild patellofemroal changes bilaterally. Complete joint space loss in the medial aspect of the left knee    AP pelvis single view performed on 9/16/2021 and reviewed today     Some obvious early osteoarthritis of the right hip on pelvis stable with no additional lesions . No obvious osteoarthritis of the left hip. Reviewed MRI performed on 2/7/2022 revealing KL grade 4 osteoarthritic changes on the medial aspect of the knee with chronic full-thickness ACL tear due to impingement at the notch with mild passive anterior tibial translation. Post meniscectomy changes of the medial meniscus meniscus no acute tear of medial or lateral meniscus grade 3 changes on the lateral and patellofemoral compartments insufficiency fractures noted to tibial plateau and femoral condyle on the medial aspect of the knee    Assessment and Plan:       Diagnosis Orders   1.  Chronic pain of left knee  VA ARTHROCENTESIS ASPIR&/INJ MAJOR JT/BURSA W/O US    triamcinolone acetonide (KENALOG-40) injection utilization of hinged knee brace across the left knee. -We will plan to continue to manage with conservative treatment until her return from [address] in May  -Did discuss potential pursuance of intra-articular injection with corticosteroid or PRP and possible genicular nerve block pending patient's needs. Plan for supplements, topical and oral anti-inflammatory medications. Shoe inserts for the imbalance of the posterior tibial tendons. Shoe lift for the lld should help the right hip. Advised for continued utilization of bracing for long periods of standing and walking.  -Based upon the MRI pursuance of a partial knee arthroplasty would not be possible due to chronic complete tear of the ACL and severity of arthritis in the lateral and patellofemoral components. Advised patient to follow-up in 3 months for repeat evaluation and possible repeat injections or further discussion regarding total knee arthroplasty replacement with patient's overall function and pain level would not recommend pursuing replacement at present time but if pain changes or symptoms change or worsen would recommend reevaluation sooner. Physical therapy for the lumbar spine and bilateral knees    I discussed the diagnosis of arthritis with the patient. We've discussed treatment options which would include anti-inflammatory medication, physical therapy, bracing, cortisone injections, and Visco supplementation. We have also discussed the benefits of low impact exercise and weight loss. We have also discussed the possibility of joint replacement surgery in the future.                DEE Houston

## 2023-02-03 ENCOUNTER — TELEPHONE (OUTPATIENT)
Dept: PRIMARY CARE CLINIC | Age: 58
End: 2023-02-03

## 2023-02-03 NOTE — TELEPHONE ENCOUNTER
----- Message from Ana Estrada sent at 2/2/2023  3:53 PM EST -----  Subject: Message to Provider    QUESTIONS  Information for Provider? Jasiel Little has an appointment scheduled for 02/06/23   at 10:40 am and would like for Dr. Puma Copeland to use her labs that she had   completed on 01/30/23 at [address] [facility] [address] Orders are from [facility]. Jasiel Little can be reached at   [phone]   ---------------------------------------------------------------------------  --------------  [employer]  [phone]; OK to respond with electronic message via [employer] portal (only   for patients who have registered [employer] account)  ---------------------------------------------------------------------------  --------------  SCRIPT ANSWERS  Relationship to Patient?  Self

## 2023-02-06 ENCOUNTER — OFFICE VISIT (OUTPATIENT)
Dept: PRIMARY CARE CLINIC | Age: 58
End: 2023-02-06
Payer: COMMERCIAL

## 2023-02-06 ENCOUNTER — TELEPHONE (OUTPATIENT)
Dept: PRIMARY CARE CLINIC | Age: 58
End: 2023-02-06

## 2023-02-06 VITALS
DIASTOLIC BLOOD PRESSURE: 82 MMHG | SYSTOLIC BLOOD PRESSURE: 120 MMHG | HEIGHT: 65 IN | HEART RATE: 88 BPM | WEIGHT: 235 LBS | OXYGEN SATURATION: 97 % | BODY MASS INDEX: 39.15 KG/M2 | TEMPERATURE: 98.2 F

## 2023-02-06 PROCEDURE — G8484 FLU IMMUNIZE NO ADMIN: HCPCS | Performed by: FAMILY MEDICINE

## 2023-02-06 PROCEDURE — 1036F TOBACCO NON-USER: CPT | Performed by: FAMILY MEDICINE

## 2023-02-06 PROCEDURE — G8417 CALC BMI ABV UP PARAM F/U: HCPCS | Performed by: FAMILY MEDICINE

## 2023-02-06 PROCEDURE — 3017F COLORECTAL CA SCREEN DOC REV: CPT | Performed by: FAMILY MEDICINE

## 2023-02-06 PROCEDURE — G8428 CUR MEDS NOT DOCUMENT: HCPCS | Performed by: FAMILY MEDICINE

## 2023-02-06 PROCEDURE — 99202 OFFICE O/P NEW SF 15 MIN: CPT | Performed by: FAMILY MEDICINE

## 2023-02-06 SDOH — ECONOMIC STABILITY: FOOD INSECURITY: WITHIN THE PAST 12 MONTHS, THE FOOD YOU BOUGHT JUST DIDN'T LAST AND YOU DIDN'T HAVE MONEY TO GET MORE.: NEVER TRUE

## 2023-02-06 SDOH — ECONOMIC STABILITY: FOOD INSECURITY: WITHIN THE PAST 12 MONTHS, YOU WORRIED THAT YOUR FOOD WOULD RUN OUT BEFORE YOU GOT MONEY TO BUY MORE.: NEVER TRUE

## 2023-02-06 SDOH — HEALTH STABILITY: PHYSICAL HEALTH: ON AVERAGE, HOW MANY MINUTES DO YOU ENGAGE IN EXERCISE AT THIS LEVEL?: 60 MIN

## 2023-02-06 SDOH — HEALTH STABILITY: PHYSICAL HEALTH: ON AVERAGE, HOW MANY DAYS PER WEEK DO YOU ENGAGE IN MODERATE TO STRENUOUS EXERCISE (LIKE A BRISK WALK)?: 1 DAY

## 2023-02-06 SDOH — ECONOMIC STABILITY: HOUSING INSECURITY
IN THE LAST 12 MONTHS, WAS THERE A TIME WHEN YOU DID NOT HAVE A STEADY PLACE TO SLEEP OR SLEPT IN A SHELTER (INCLUDING NOW)?: NO

## 2023-02-06 SDOH — ECONOMIC STABILITY: INCOME INSECURITY: HOW HARD IS IT FOR YOU TO PAY FOR THE VERY BASICS LIKE FOOD, HOUSING, MEDICAL CARE, AND HEATING?: NOT HARD AT ALL

## 2023-02-06 ASSESSMENT — PATIENT HEALTH QUESTIONNAIRE - PHQ9
SUM OF ALL RESPONSES TO PHQ QUESTIONS 1-9: 0
SUM OF ALL RESPONSES TO PHQ QUESTIONS 1-9: 0
SUM OF ALL RESPONSES TO PHQ9 QUESTIONS 1 & 2: 0
1. LITTLE INTEREST OR PLEASURE IN DOING THINGS: 0
SUM OF ALL RESPONSES TO PHQ QUESTIONS 1-9: 0
2. FEELING DOWN, DEPRESSED OR HOPELESS: 0
SUM OF ALL RESPONSES TO PHQ QUESTIONS 1-9: 0

## 2023-02-06 NOTE — PROGRESS NOTES
[address] [facility] [address] [facility]  [address] [place] [id number]  Dept: [phone]  Dept Fax: [phone]     2/6/2023      Annette Villanueva   1965     Chief Complaint   Patient presents with    Establish Care    Weight Management       HPI    Pt comes in today for discussion regarding obesity. Was referred by her PCP to [facility] in December. Met with Kalyan DUNBAR. Then saw dietician. Underwent blood work and then was scheduled for follow up visit to discuss med mgmt in ~ 2 weeks. Surgery is 5/17 for left total knee arthroplasty. Hoping to lose 30# by her surgery. She apparently scheduled here today because she was under the impression I was a weight loss physician as my name came up on google. PHQ-9 Total Score: 0 (2/6/2023 10:43 AM)       Prior to Visit Medications    Medication Sig Taking? Authorizing Provider   diclofenac (VOLTAREN) 50 MG EC tablet Take 1 tablet by mouth 2 times daily (with meals)  DEE Piña   diclofenac (VOLTAREN) 50 MG EC tablet Take 1 tablet by mouth 2 times daily (with meals)  DEE Piña   mupirocin (BACTROBAN) 2 % ointment Apply 2 Doses topically 3 times daily  Historical Provider, MD   olmesartan (BENICAR) 5 MG tablet Take 40 mg by mouth daily  Historical Provider, MD   acetaminophen (TYLENOL) 500 MG tablet Take 500 mg by mouth  Historical Provider, MD        No past medical history on file. Social History     Tobacco Use    Smoking status: Never    Smokeless tobacco: Never        No past surgical history on file.      Allergies   Allergen Reactions    Amoxicillin Hives     New allergy     Pine Rash     Burning rash         Family History   Problem Relation Age of Onset    Breast Cancer Mother     Breast Cancer Maternal Grandmother         Patient's past medical history, surgical history, family history, medications, and allergies  were all reviewed and updated as appropriate today. Review of Systems   Constitutional:  Negative for unexpected weight change. /82 (Site: Right Upper Arm)   Pulse 88   Temp 98.2 °F (36.8 °C)   Ht 5' 4.75\" (1.645 m)   Wt 235 lb (106.6 kg)   SpO2 97%   BMI 39.41 kg/m²      Physical Exam  Cardiovascular:      Rate and Rhythm: Normal rate. Pulmonary:      Effort: Pulmonary effort is normal.   Neurological:      Mental Status: She is alert. Psychiatric:         Mood and Affect: Mood normal.       Assessment:  Encounter Diagnosis   Name Primary? BMI 39.0-39.9,adult Yes       Plan:    - Reviewed records and recent lab results. A1c = 5.3%. Discussed options thru her insurance including Louise Speaker and Contrave. Wegovy likely cost prohibitive. She is most interested in starting the Qsymia. - Advised patient that she will need to follow up with PCP/weight loss provider regarding weight mgmt options since we are primary care and would not be appropriate to start treatment when she is under the care of two other providers already. New Prescriptions    No medications on file        No orders of the defined types were placed in this encounter. Return for N/A - has PCP at [facility].     Total time spent with patient including direct consultation, evaluation, review of medical records and documentation = [id number] Tiki Yanez DO

## 2023-02-06 NOTE — TELEPHONE ENCOUNTER
Pt calling back after today's visit to let  know where she found her name as a weight loss specialist    [email or web address]  Prescription weight loss meds and explanations  Find a weight loss specialist nearby  Dr's name in the scrolling list    She says she appreciates her visit here today but she already has a pcp, so she will not be returning    She says she took a picture of the page that she can text if needed

## 2023-03-01 ENCOUNTER — HOSPITAL ENCOUNTER (OUTPATIENT)
Dept: MAMMOGRAPHY | Age: 58
Discharge: HOME | End: 2023-03-01
Payer: COMMERCIAL

## 2023-03-01 VITALS — BODY MASS INDEX: 36.96 KG/M2 | HEIGHT: 66 IN | WEIGHT: 230 LBS

## 2023-03-01 DIAGNOSIS — Z12.31 VISIT FOR SCREENING MAMMOGRAM: ICD-10-CM

## 2023-03-01 PROCEDURE — 77067 SCR MAMMO BI INCL CAD: CPT

## 2024-03-11 ENCOUNTER — HOSPITAL ENCOUNTER (OUTPATIENT)
Dept: MAMMOGRAPHY | Age: 59
Discharge: HOME | End: 2024-03-11
Payer: COMMERCIAL

## 2024-03-11 DIAGNOSIS — Z12.31 VISIT FOR SCREENING MAMMOGRAM: ICD-10-CM

## 2024-03-11 PROCEDURE — 77063 BREAST TOMOSYNTHESIS BI: CPT

## 2025-03-13 ENCOUNTER — HOSPITAL ENCOUNTER (OUTPATIENT)
Dept: MAMMOGRAPHY | Age: 60
Discharge: HOME | End: 2025-03-13
Payer: COMMERCIAL

## 2025-03-13 VITALS — BODY MASS INDEX: 36.32 KG/M2 | HEIGHT: 65 IN | WEIGHT: 218 LBS

## 2025-03-13 DIAGNOSIS — Z12.31 VISIT FOR SCREENING MAMMOGRAM: ICD-10-CM

## 2025-03-13 PROCEDURE — 77063 BREAST TOMOSYNTHESIS BI: CPT
